# Patient Record
Sex: FEMALE | Race: ASIAN | Employment: OTHER | URBAN - METROPOLITAN AREA
[De-identification: names, ages, dates, MRNs, and addresses within clinical notes are randomized per-mention and may not be internally consistent; named-entity substitution may affect disease eponyms.]

---

## 2017-01-05 ENCOUNTER — PATIENT MESSAGE (OUTPATIENT)
Dept: INTERNAL MEDICINE CLINIC | Facility: CLINIC | Age: 82
End: 2017-01-05

## 2017-01-05 NOTE — TELEPHONE ENCOUNTER
From: Mustapha Trujillo  To: Shivam Raza MD  Sent: 1/5/2017 5:52 AM CST  Subject: Non-Urgent Medical Question    Jermaine Trujillo,    Thank you for your help with the FMLA forms.  In regards to the questions you messaged me:    1) I would be seeking intermittent FMLA for

## 2017-04-06 ENCOUNTER — PATIENT MESSAGE (OUTPATIENT)
Dept: INTERNAL MEDICINE CLINIC | Facility: CLINIC | Age: 82
End: 2017-04-06

## 2017-04-06 NOTE — TELEPHONE ENCOUNTER
From: Rose Gasca  To: Paulette Correa MD  Sent: 4/6/2017 4:13 PM CDT  Subject: Non-Urgent Medical Question    Thank you, Palma Duvall. I will fax to your attention.     Thanks,  Armaan Rich    ----- Message -----   From: Julian Cruz   Sent: 4/6/17, 3:43 PM  To: Klaudia Medrano Nita     ----- Message -----   From: Jose C Dsouza   Sent: 1/5/17, 8:52 AM   To: Gerri Noriega   Subject: RE: Non-Urgent Medical Question     Jermaine Moya,     I just wanted to let you know the forms are complete. Would you like to pick them up?  Or would you like me

## 2017-04-06 NOTE — TELEPHONE ENCOUNTER
From: Sahra Lynne  To: Rinku Erwin MD  Sent: 4/6/2017 6:38 AM CDT  Subject: Non-Urgent Medical Question    Jermaine Trujillo,    Would Dr. Nikolay Pruitt be able to renew the intermittent FMLA for my mother if I faxed over the form? She has an appointment later this month.

## 2017-04-10 ENCOUNTER — MED REC SCAN ONLY (OUTPATIENT)
Dept: INTERNAL MEDICINE CLINIC | Facility: CLINIC | Age: 82
End: 2017-04-10

## 2017-04-10 ENCOUNTER — PATIENT MESSAGE (OUTPATIENT)
Dept: INTERNAL MEDICINE CLINIC | Facility: CLINIC | Age: 82
End: 2017-04-10

## 2017-04-10 NOTE — TELEPHONE ENCOUNTER
From: [employer]  To: Anuradha Moar MD  Sent: 4/10/2017 5:48 AM CDT  Subject: Non-Urgent Medical Question    Jermaine Trujillo,    Thank you so much! Would you mind faxing the form to my employer ([employer]) at the number on the form?     I really appreciate al From: Arabella Gonzales   Sent: 4/6/17, 8:47 AM   To: Sahra Lynne   Subject: RE: Non-Urgent [address],     Would you want your intermittent FMLA to be written for the same frequency and a 3 month time?      Thank you,     Melecio Turcios RN     ----- Simone Zepeda again,   Jennifer Mediate

## 2017-04-28 ENCOUNTER — OFFICE VISIT (OUTPATIENT)
Dept: INTERNAL MEDICINE CLINIC | Facility: CLINIC | Age: 82
End: 2017-04-28

## 2017-04-28 VITALS
OXYGEN SATURATION: 94 % | DIASTOLIC BLOOD PRESSURE: 70 MMHG | HEART RATE: 95 BPM | HEIGHT: 59 IN | TEMPERATURE: 98 F | SYSTOLIC BLOOD PRESSURE: 130 MMHG | RESPIRATION RATE: 16 BRPM

## 2017-04-28 DIAGNOSIS — I10 ESSENTIAL HYPERTENSION: Primary | ICD-10-CM

## 2017-04-28 PROCEDURE — 99213 OFFICE O/P EST LOW 20 MIN: CPT | Performed by: INTERNAL MEDICINE

## 2017-04-28 RX ORDER — LISINOPRIL 20 MG/1
20 TABLET ORAL
Qty: 90 TABLET | Refills: 1 | Status: SHIPPED | OUTPATIENT
Start: 2017-04-28 | End: 2017-12-23

## 2017-04-28 NOTE — PATIENT INSTRUCTIONS
Eating Heart-Healthy Foods  Eating has a big impact on your heart health. In fact, eating healthier can improve several of your heart risks at once. For instance, it helps you manage weight, cholesterol, and blood pressure.  Here are ideas to help you jose Aim to make these foods staples of your diet. If you have diabetes, you may have different recommendations than what is listed here:  · Fruits and vegetable provide plenty of nutrients without a lot of calories.  At meals, fill half your plate with these fo · Choose ingredients that spice up your food without adding calories, fat, or sodium. Try these items: horseradish, hot sauce, lemon, mustard, nonfat salad dressings, and vinegar.  For salt-free herbs and spices, try basil, cilantro, cinnamon, pepper, and r

## 2017-04-28 NOTE — PROGRESS NOTES
HPI:    Patient ID: Ashley Martinez is a 80year old female. HPI  Ashley Martinez is a 80year old female. HPI:   Patient presents for recheck of her hypertension.  Pt has been taking medications as instructed, no medication side effects, home BP monitoring in t Location: [address] [place] [address]      Social History:      Smoking Status: Never Smoker                      Smokeless Status: Never Used                        Alcohol Use: No                  REVIEW OF SYSTEMS:   GENERAL HEALTH: feels well otherwise  SKIN: denies Visit:  Signed Prescriptions Disp Refills    lisinopril 20 MG Oral Tab 90 tablet 1      Sig: Take 1 tablet (20 mg total) by mouth once daily.            Imaging & Referrals:  None       #[id number]

## 2017-08-30 ENCOUNTER — PATIENT OUTREACH (OUTPATIENT)
Dept: INTERNAL MEDICINE CLINIC | Facility: CLINIC | Age: 82
End: 2017-08-30

## 2017-08-30 NOTE — PROGRESS NOTES
Spoke with PEREZ, son, and stated patient is due for her AWV. Son stated he is at work, and will call back to schedule an AWV.

## 2017-09-12 ENCOUNTER — OFFICE VISIT (OUTPATIENT)
Dept: INTERNAL MEDICINE CLINIC | Facility: CLINIC | Age: 82
End: 2017-09-12

## 2017-09-12 ENCOUNTER — HOSPITAL ENCOUNTER (OUTPATIENT)
Dept: GENERAL RADIOLOGY | Facility: HOSPITAL | Age: 82
Discharge: HOME | End: 2017-09-12
Attending: INTERNAL MEDICINE
Payer: MEDICARE

## 2017-09-12 VITALS
HEIGHT: 59 IN | TEMPERATURE: 98 F | OXYGEN SATURATION: 96 % | DIASTOLIC BLOOD PRESSURE: 62 MMHG | RESPIRATION RATE: 18 BRPM | HEART RATE: 99 BPM | SYSTOLIC BLOOD PRESSURE: 102 MMHG

## 2017-09-12 DIAGNOSIS — M25.552 LEFT HIP PAIN: Primary | ICD-10-CM

## 2017-09-12 DIAGNOSIS — M25.552 LEFT HIP PAIN: ICD-10-CM

## 2017-09-12 DIAGNOSIS — Z28.21 INFLUENZA VACCINATION DECLINED BY PATIENT: ICD-10-CM

## 2017-09-12 PROCEDURE — 99213 OFFICE O/P EST LOW 20 MIN: CPT | Performed by: INTERNAL MEDICINE

## 2017-09-12 PROCEDURE — 73502 X-RAY EXAM HIP UNI 2-3 VIEWS: CPT | Performed by: INTERNAL MEDICINE

## 2017-09-12 RX ORDER — MELATONIN
1000 DAILY
COMMUNITY

## 2017-09-12 RX ORDER — ACETAMINOPHEN AND CODEINE PHOSPHATE 300; 30 MG/1; MG/1
1 TABLET ORAL EVERY 6 HOURS PRN
Qty: 40 TABLET | Refills: 0 | Status: ON HOLD | OUTPATIENT
Start: 2017-09-12 | End: 2017-09-19

## 2017-09-12 NOTE — PATIENT INSTRUCTIONS
Hip Dislocation  The hip is a ball and socket joint. The round head of the thigh bone fits into the socket of the pelvis. A hip dislocation happens when the femur pops out of its socket.  This takes a strong force which tears the ligaments and joint capsu · Shortness of breath or chest pain  When to seek medical advice  Call your healthcare provider right away if any of these occur:  · Increasing hip pain  · Injured leg becomes pale or cold  · Numbness or weakness in the affected leg  · Increasing swelling,

## 2017-09-12 NOTE — PROGRESS NOTES
HPI:    Patient ID: Kareem Gibson is a 80year old female. Hip Pain    Pain location: left hip. This is a new problem. The current episode started yesterday. There has been a history of trauma (fall). The problem occurs constantly.  The problem has been grad appears well-developed and well-nourished. No distress. Cardiovascular: Normal rate, regular rhythm and normal heart sounds. No murmur heard. Pulmonary/Chest: Effort normal and breath sounds normal. No respiratory distress. She has no wheezes.  She ha

## 2017-09-13 ENCOUNTER — TELEPHONE (OUTPATIENT)
Dept: INTERNAL MEDICINE CLINIC | Facility: CLINIC | Age: 82
End: 2017-09-13

## 2017-09-13 DIAGNOSIS — S72.002A CLOSED FRACTURE OF LEFT HIP, INITIAL ENCOUNTER (HCC): ICD-10-CM

## 2017-09-13 DIAGNOSIS — S73.005A DISLOCATION OF LEFT HIP, INITIAL ENCOUNTER (HCC): Primary | ICD-10-CM

## 2017-09-13 NOTE — TELEPHONE ENCOUNTER
Meg Crowder MD  Orthopedic Surgery, Sports Medicine  [address]  [address], [address]  Phone: [phone]  Fax: [phone] with Son and relayed results. He verbalized understanding and agreed with plan.  He will have patient's da

## 2017-09-13 NOTE — TELEPHONE ENCOUNTER
----- Message from Kelly Matthews MD sent at 9/13/2017  8:06 AM CDT -----  Mildly displaced, acute appearing fracture of the medial left superior pubic ramus  Due to age, I feel fracture should be treated non operatively.  Refer to dr Liam Patino for opinion

## 2017-09-13 NOTE — TELEPHONE ENCOUNTER
Son called back and I relayed results again and gave Dr. Pita Lizarraga information.  He verbalized understanding and stated they will call Dr. Julia Baker today

## 2017-09-16 ENCOUNTER — HOSPITAL ENCOUNTER (INPATIENT)
Facility: HOSPITAL | Age: 82
LOS: 3 days | Discharge: NURSING, REHAB OR LONG-TERM CARE | DRG: 091 | End: 2017-09-19
Attending: EMERGENCY MEDICINE | Admitting: HOSPITALIST
Payer: MEDICARE

## 2017-09-16 ENCOUNTER — APPOINTMENT (OUTPATIENT)
Dept: GENERAL RADIOLOGY | Facility: HOSPITAL | Age: 82
DRG: 091 | End: 2017-09-16
Attending: EMERGENCY MEDICINE
Payer: MEDICARE

## 2017-09-16 ENCOUNTER — APPOINTMENT (OUTPATIENT)
Dept: CT IMAGING | Facility: HOSPITAL | Age: 82
DRG: 091 | End: 2017-09-16
Attending: EMERGENCY MEDICINE
Payer: MEDICARE

## 2017-09-16 DIAGNOSIS — N39.0 URINARY TRACT INFECTION WITHOUT HEMATURIA, SITE UNSPECIFIED: ICD-10-CM

## 2017-09-16 DIAGNOSIS — R26.9 GAIT DISTURBANCE: ICD-10-CM

## 2017-09-16 DIAGNOSIS — M25.552 LEFT HIP PAIN: ICD-10-CM

## 2017-09-16 DIAGNOSIS — E86.0 DEHYDRATION: ICD-10-CM

## 2017-09-16 DIAGNOSIS — R40.0 SOMNOLENCE: Primary | ICD-10-CM

## 2017-09-16 DIAGNOSIS — S32.592G: ICD-10-CM

## 2017-09-16 DIAGNOSIS — D64.9 ANEMIA, UNSPECIFIED TYPE: ICD-10-CM

## 2017-09-16 LAB
ALBUMIN SERPL-MCNC: 3.2 G/DL (ref 3.5–4.8)
ALP LIVER SERPL-CCNC: 60 U/L (ref 55–142)
ALT SERPL-CCNC: 16 U/L (ref 14–54)
AST SERPL-CCNC: 35 U/L (ref 15–41)
BASOPHILS # BLD AUTO: 0.02 X10(3) UL (ref 0–0.1)
BASOPHILS NFR BLD AUTO: 0.2 %
BILIRUB SERPL-MCNC: 0.6 MG/DL (ref 0.1–2)
BILIRUB UR QL STRIP.AUTO: NEGATIVE
BUN BLD-MCNC: 28 MG/DL (ref 8–20)
CALCIUM BLD-MCNC: 8.5 MG/DL (ref 8.3–10.3)
CHLORIDE: 103 MMOL/L (ref 101–111)
CO2: 25 MMOL/L (ref 22–32)
COLOR UR AUTO: YELLOW
CREAT BLD-MCNC: 0.88 MG/DL (ref 0.55–1.02)
EOSINOPHIL # BLD AUTO: 0.05 X10(3) UL (ref 0–0.3)
EOSINOPHIL NFR BLD AUTO: 0.6 %
ERYTHROCYTE [DISTWIDTH] IN BLOOD BY AUTOMATED COUNT: 12.9 % (ref 11.5–16)
GLUCOSE BLD-MCNC: 134 MG/DL (ref 70–99)
GLUCOSE UR STRIP.AUTO-MCNC: NEGATIVE MG/DL
HCT VFR BLD AUTO: 33.4 % (ref 34–50)
HGB BLD-MCNC: 11 G/DL (ref 12–16)
IMMATURE GRANULOCYTE COUNT: 0.03 X10(3) UL (ref 0–1)
IMMATURE GRANULOCYTE RATIO %: 0.4 %
KETONES UR STRIP.AUTO-MCNC: NEGATIVE MG/DL
LYMPHOCYTES # BLD AUTO: 1.13 X10(3) UL (ref 0.9–4)
LYMPHOCYTES NFR BLD AUTO: 13.8 %
M PROTEIN MFR SERPL ELPH: 8 G/DL (ref 6.1–8.3)
MCH RBC QN AUTO: 31.3 PG (ref 27–33.2)
MCHC RBC AUTO-ENTMCNC: 32.9 G/DL (ref 31–37)
MCV RBC AUTO: 94.9 FL (ref 81–100)
MONOCYTES # BLD AUTO: 0.53 X10(3) UL (ref 0.1–0.6)
MONOCYTES NFR BLD AUTO: 6.5 %
NEUTROPHIL ABS PRELIM: 6.43 X10 (3) UL (ref 1.3–6.7)
NEUTROPHILS # BLD AUTO: 6.43 X10(3) UL (ref 1.3–6.7)
NEUTROPHILS NFR BLD AUTO: 78.5 %
NITRITE UR QL STRIP.AUTO: POSITIVE
PH UR STRIP.AUTO: 5 [PH] (ref 4.5–8)
PLATELET # BLD AUTO: 239 10(3)UL (ref 150–450)
POTASSIUM SERPL-SCNC: 5.3 MMOL/L (ref 3.6–5.1)
PROT UR STRIP.AUTO-MCNC: NEGATIVE MG/DL
RBC # BLD AUTO: 3.52 X10(6)UL (ref 3.8–5.1)
RED CELL DISTRIBUTION WIDTH-SD: 44.7 FL (ref 35.1–46.3)
SODIUM SERPL-SCNC: 134 MMOL/L (ref 136–144)
SP GR UR STRIP.AUTO: 1.02 (ref 1–1.03)
UROBILINOGEN UR STRIP.AUTO-MCNC: <2 MG/DL
WBC # BLD AUTO: 8.2 X10(3) UL (ref 4–13)
WBC CLUMPS UR QL AUTO: PRESENT

## 2017-09-16 PROCEDURE — 74000 XR ABDOMEN (KUB) (1 AP VIEW)  (CPT=74000): CPT | Performed by: EMERGENCY MEDICINE

## 2017-09-16 PROCEDURE — 76376 3D RENDER W/INTRP POSTPROCES: CPT | Performed by: EMERGENCY MEDICINE

## 2017-09-16 PROCEDURE — 73700 CT LOWER EXTREMITY W/O DYE: CPT | Performed by: EMERGENCY MEDICINE

## 2017-09-16 PROCEDURE — 99223 1ST HOSP IP/OBS HIGH 75: CPT | Performed by: HOSPITALIST

## 2017-09-16 RX ORDER — BISACODYL 10 MG
10 SUPPOSITORY, RECTAL RECTAL ONCE
Status: COMPLETED | OUTPATIENT
Start: 2017-09-16 | End: 2017-09-16

## 2017-09-16 RX ORDER — POLYETHYLENE GLYCOL 3350 17 G/17G
17 POWDER, FOR SOLUTION ORAL DAILY
Status: DISCONTINUED | OUTPATIENT
Start: 2017-09-17 | End: 2017-09-17

## 2017-09-16 RX ORDER — HEPARIN SODIUM 5000 [USP'U]/ML
5000 INJECTION, SOLUTION INTRAVENOUS; SUBCUTANEOUS EVERY 12 HOURS SCHEDULED
Status: DISCONTINUED | OUTPATIENT
Start: 2017-09-16 | End: 2017-09-19

## 2017-09-16 RX ORDER — SODIUM CHLORIDE 9 MG/ML
INJECTION, SOLUTION INTRAVENOUS CONTINUOUS
Status: DISCONTINUED | OUTPATIENT
Start: 2017-09-16 | End: 2017-09-17

## 2017-09-16 RX ORDER — ONDANSETRON 2 MG/ML
8 INJECTION INTRAMUSCULAR; INTRAVENOUS EVERY 6 HOURS PRN
Status: DISCONTINUED | OUTPATIENT
Start: 2017-09-16 | End: 2017-09-19

## 2017-09-16 RX ORDER — ACETAMINOPHEN AND CODEINE PHOSPHATE 300; 30 MG/1; MG/1
1 TABLET ORAL EVERY 4 HOURS PRN
Status: DISCONTINUED | OUTPATIENT
Start: 2017-09-16 | End: 2017-09-19

## 2017-09-16 RX ORDER — TRAZODONE HYDROCHLORIDE 50 MG/1
50 TABLET ORAL NIGHTLY PRN
Status: DISCONTINUED | OUTPATIENT
Start: 2017-09-16 | End: 2017-09-19

## 2017-09-16 RX ORDER — LISINOPRIL 20 MG/1
20 TABLET ORAL
Status: DISCONTINUED | OUTPATIENT
Start: 2017-09-17 | End: 2017-09-19

## 2017-09-16 RX ORDER — HYDRALAZINE HYDROCHLORIDE 20 MG/ML
5 INJECTION INTRAMUSCULAR; INTRAVENOUS EVERY 6 HOURS PRN
Status: DISCONTINUED | OUTPATIENT
Start: 2017-09-16 | End: 2017-09-17

## 2017-09-16 RX ORDER — ACETAMINOPHEN 325 MG/1
650 TABLET ORAL EVERY 6 HOURS PRN
Status: DISCONTINUED | OUTPATIENT
Start: 2017-09-16 | End: 2017-09-19

## 2017-09-16 RX ORDER — SODIUM CHLORIDE 9 MG/ML
INJECTION, SOLUTION INTRAVENOUS ONCE
Status: COMPLETED | OUTPATIENT
Start: 2017-09-16 | End: 2017-09-16

## 2017-09-16 NOTE — ED NOTES
PT to ED with son with diagnosed pelvic fracture from prior fall. Son reports increased pain and pt feeling more tired.

## 2017-09-16 NOTE — H&P
[facility]  History and Physical     Anette Robles Patient Status:  Emergency    3/25/1927 MRN [id number]   Location [address] [facility] Attending Darren Wynn MD   Hosp Day # 0 PCP Juan Alberto Campso MD     Chief Complaint: con tablet by mouth every 6 (six) hours as needed for Pain. Disp: 40 tablet Rfl: 0   lisinopril 20 MG Oral Tab Take 1 tablet (20 mg total) by mouth once daily. Disp: 90 tablet Rfl: 1   CALCIUM OR Take 1,000 mg by mouth daily.  Disp:  Rfl:    Cholecalciferol (VI input(s): TROP, CK in the last 72 hours. Imaging: Imaging data reviewed in Epic. ASSESSMENT / PLAN:     1. Recent fall with pelvic fractures and inability to ambulate  2.  Encephalopathy due to narcotics and UTI-check abg to ensure no hypercapnea  3

## 2017-09-16 NOTE — ED PROVIDER NOTES
Patient Seen in: [facility] Emergency Department    History   Patient presents with:  Pain (neurologic)    Stated Complaint: pelvic fracture - pain, not eating    HPI    28-year-old female presents to the emergency department in the care of her fami reviewed from today and agreed except as otherwise stated in HPI.     Physical Exam   ED Triage Vitals  BP: 115/69 [09/16/17 1213]  Pulse: 97 [09/16/17 1213]  Resp: 14 [09/16/17 1213]  Temp: 99.5 °F (37.5 °C) [09/16/17 1213]  Temp src: Temporal [09/16/17 12 (*)     RBC URINE 3-5 (*)     Bacteria Urine 3+ (*)     Mucous Urine 1+ (*)     WBC Clump Present (*)     All other components within normal limits   CBC W/ DIFFERENTIAL - Abnormal; Notable for the following:     RBC 3.52 (*)     HGB 11.0 (*)     HCT 33.4 there was not a more serious injury that perhaps is not well seen on the plain films. There was evidence of a superior rami fracture as well but no acute hip fracture.   With the multiple issues going on and patient declining it is felt that she is best se

## 2017-09-16 NOTE — ED INITIAL ASSESSMENT (HPI)
Pt's son states pt had a fall on Monday, had x-rays and diagnosed with pelvic fracture. Son reports generalized weakness since fall.

## 2017-09-17 PROBLEM — E46 MALNUTRITION (HCC): Status: ACTIVE | Noted: 2017-09-17

## 2017-09-17 LAB
BUN BLD-MCNC: 18 MG/DL (ref 8–20)
CALCIUM BLD-MCNC: 8.1 MG/DL (ref 8.3–10.3)
CHLORIDE: 109 MMOL/L (ref 101–111)
CO2: 18 MMOL/L (ref 22–32)
CREAT BLD-MCNC: 0.53 MG/DL (ref 0.55–1.02)
GLUCOSE BLD-MCNC: 77 MG/DL (ref 70–99)
POTASSIUM SERPL-SCNC: 4 MMOL/L (ref 3.6–5.1)
SODIUM SERPL-SCNC: 139 MMOL/L (ref 136–144)

## 2017-09-17 PROCEDURE — 99232 SBSQ HOSP IP/OBS MODERATE 35: CPT | Performed by: HOSPITALIST

## 2017-09-17 RX ORDER — LACTULOSE 10 G/15ML
20 SOLUTION ORAL ONCE
Status: COMPLETED | OUTPATIENT
Start: 2017-09-17 | End: 2017-09-17

## 2017-09-17 RX ORDER — LABETALOL HYDROCHLORIDE 5 MG/ML
10 INJECTION, SOLUTION INTRAVENOUS EVERY 4 HOURS PRN
Status: DISCONTINUED | OUTPATIENT
Start: 2017-09-17 | End: 2017-09-19

## 2017-09-17 RX ORDER — SENNA AND DOCUSATE SODIUM 50; 8.6 MG/1; MG/1
2 TABLET, FILM COATED ORAL 2 TIMES DAILY
Status: DISCONTINUED | OUTPATIENT
Start: 2017-09-17 | End: 2017-09-18

## 2017-09-17 RX ORDER — TRAMADOL HYDROCHLORIDE 50 MG/1
50 TABLET ORAL EVERY 6 HOURS PRN
Status: DISCONTINUED | OUTPATIENT
Start: 2017-09-17 | End: 2017-09-19

## 2017-09-17 NOTE — ED NOTES
Bedside report from [facility] received. Pt remains resting comfortably with adult family at bs. Phlebotomy has been paged for blood c/s x 2. IVPB at bs for infusion after cultures drawn by phlebotomy. Family updated on inpt poc and verbalized understanding.

## 2017-09-17 NOTE — DIETARY MALNUTRITION NOTE
NUTRITION INITIAL ASSESSMENT     Pt meets moderate malnutrition criteria.     NUTRITION DIAGNOSIS/PROBLEM:    Malnutrition related to extended age, AMS, lethargy as evidenced by decreased appetite/intake for >5 days and notable muscle wasting at temple and No  Food Allergies: No  Cultural/Ethnic/Pentecostalism Preferences Addresses: Yes    NUTRITION RELATED PHYSICAL FINDINGS:     1. Body Fat/Muscle Mass: mild muscle wasting noted at temple and clavicle      2.  Fluid Accumulation: none noted     NUTRITION PRESCRIP

## 2017-09-17 NOTE — PLAN OF CARE
PAIN - ADULT    • Verbalizes/displays adequate comfort level or patient's stated pain goal Progressing          PAIN - ADULT    • Verbalizes/displays adequate comfort level or patient's stated pain goal Progressing

## 2017-09-17 NOTE — OCCUPATIONAL THERAPY NOTE
OCCUPATIONAL THERAPY EVALUATION - INPATIENT     Room Number: 361/361-A  Evaluation Date: 9/17/2017  Type of Evaluation: Initial  Presenting Problem: fall 9/12 & L pelvic fx, generalized weakness since home from ER    Physician Order: IP Consult to [facility] bench  Other Equipment: None    Occupation/Status: not working  Hand Dominance: Right  Drives: No  Patient Regularly Uses: None    Prior Level of Function: Pt is never home alone. Pt lives with son and when son is at work others are there to care for pt. on observation of tasks)   Fine Motor    WFL (based on observation of tasks)     ADDITIONAL TESTS       Modified Barthel Index score of 3/20; <15 usually indicates moderate disability; <10 usually indicates severe disability in ADL dysfunction in the areas Patient End of Session: Up in chair;Needs met;Call light within reach;RN aware of session/findings; All patient questions and concerns addressed;SCDs in place; Family present    ASSESSMENT     Patient is a 80year old female admitted on 9/16/2017 for gener Complexity  Occupational Profile/Medical History MODERATE - Expanded review of history including review of medical or therapy record   Specific performance deficits impacting engagement in ADL/IADL MODERATE  3 - 5 performance deficits   Client Assessment/P

## 2017-09-17 NOTE — PROGRESS NOTES
[facility]  Progress Note     Toan Maldonado Patient Status:  Inpatient    3/25/1927 MRN [id number]   [facility] 3SW-A Attending Yordan Hill MD   Hosp Day # 1 PCP Colin Chapman MD     Chief Complaint: hip pain    S: Patient feel rocephin  4. Hyperkalemia  5. Hyponatremia  6. Dehydration  7. Anemia  8. Malnutrition  1. Dietician eval  2. Start ensure  9. Metabolic acidosis    Plan of care: as above. PT and pain control. Laxatives for constipation.      Quality:  · DVT Prophylaxis: h

## 2017-09-17 NOTE — PHYSICAL THERAPY NOTE
PHYSICAL THERAPY EVALUATION - INPATIENT     Room Number: 361/361-A  Evaluation Date: 9/17/2017  Type of Evaluation: Initial  Physician Order: PT Eval and Treat    Presenting Problem: L Inferior Pubic Ramis Fx  Reason for Therapy: Mobility Dysfunction a with a 4WW prior to her fall. Used a wheelchair for out of home mobility. Pt's daughters assisted with dressing and bathing. Significant decline in participation with mobility, self care, and appetite since fall 9/11/17.      SUBJECTIVE  Pt's son reports sh blankets)?: Unable   -   Sitting down on and standing up from a chair with arms (e.g., wheelchair, bedside commode, etc.): Unable   -   Moving from lying on back to sitting on the side of the bed?: Unable   How much help from another person does the patien potential POC with pt's son and all questions answered prior to leaving room. Exercise/Education Provided:  Bed mobility  Transfer training    Patient End of Session: Up in chair; With [address] staff;Needs met;Call light within reach; All patient questions and to safely return home with family assist.    73% and Above LTAC   72-51% MARY/IRF   50-37% Home with [facility]   36 and below Home with no services       PLAN  PT Treatment Plan: Bed mobility; Endurance; Patient education; Family education;Gait training;Strengthening

## 2017-09-17 NOTE — PROGRESS NOTES
NURSING ADMISSION NOTE      Patient admitted via cart from ER  Oriented to room. Safety precautions initiated. Bed in low position. Call light in reach.   Son at bedside

## 2017-09-17 NOTE — PROGRESS NOTES
Spoke to Dr Puma Madrid regarding patient requesting DNR and elevated BP. Explained to both sons and patient about what at DNR is all agree with order for DNR. Order received from Dr Puma Madrid.

## 2017-09-17 NOTE — PLAN OF CARE
GASTROINTESTINAL - ADULT    • Maintains or returns to baseline bowel function Progressing        PAIN - ADULT    • Verbalizes/displays adequate comfort level or patient's stated pain goal Progressing         Has been up in chair today.   Has had a bowel mov

## 2017-09-18 ENCOUNTER — MED REC SCAN ONLY (OUTPATIENT)
Dept: INTERNAL MEDICINE CLINIC | Facility: CLINIC | Age: 82
End: 2017-09-18

## 2017-09-18 PROCEDURE — 99232 SBSQ HOSP IP/OBS MODERATE 35: CPT | Performed by: HOSPITALIST

## 2017-09-18 RX ORDER — SENNA AND DOCUSATE SODIUM 50; 8.6 MG/1; MG/1
1 TABLET, FILM COATED ORAL DAILY
Status: DISCONTINUED | OUTPATIENT
Start: 2017-09-19 | End: 2017-09-19

## 2017-09-18 NOTE — OCCUPATIONAL THERAPY NOTE
OCCUPATIONAL THERAPY TREATMENT NOTE - INPATIENT     Room Number: 871/550-T  Session: 1   Number of Visits to Meet Established Goals: 5    Presenting Problem: fall 9/12 & L pelvic fx, generalized weakness since home from ER    History related to current adm TOLERANCE  O2 Saturation: 100%  Room air  No shortness of breath  Heart Rate: to 140s and 150s intermittently, both with activity and at rest; RN notified; quickly returned to [address] [place] [address] ‘6-Clicks’ Inpatient Daily descent. Increased time required for all tasks. Patient also reinforced on safety, fall prevention, OT role, care plan and discharge recommendations. Patient End of Session: Up in chair;Needs met;Call light within reach;RN aware of session/findings; All p

## 2017-09-18 NOTE — CM/SW NOTE
09/18/17 1400   CM/SW Referral Data   Referral Source Nurse; Other  (PT/OT)   Reason for Referral Discharge planning   Informant Children;[facility] Staff   Pertinent Medical Hx   Primary Care Physician Name EZ Lowe MD   Patient Info   Patient's Mental Statu discuss with family, the possibility of taking pt home instead of going to [facility]. SW will remain available for any questions.   Will f/u in AM.

## 2017-09-18 NOTE — PLAN OF CARE
GASTROINTESTINAL - ADULT    • Maintains or returns to baseline bowel function Progressing        Impaired Activities of Daily Living    • Achieve highest/safest level of independence in self care Progressing        Impaired Functional Mobility    • Achieve

## 2017-09-18 NOTE — PROGRESS NOTES
[facility]  Progress Note     Autumn Cassidy Patient Status:  Inpatient    3/25/1927 MRN [id number]   [facility] 3SW-A Attending Laine Shabazz MD   Hosp Day # 2 PCP Daryn Mckenzie MD     Chief Complaint: hip pain    S: Patient stil narcotics  2. Improving  3. UTI  1. Change to ancef  4. Hyperkalemia  5. Hyponatremia  6. Dehydration  7. Anemia  8. Malnutrition  1. Diet supplements  9. Metabolic acidosis    Plan of care: as above. Possible d/c mazin if stable and BCX neg.     Quality:  ·

## 2017-09-18 NOTE — PROGRESS NOTES
Pt's heart rate was in the 140's and bp 161/99, PRN Labetalol was given, upon reassessment HR was in the 90's and BP was 156/80, DR Shaffer was notified.

## 2017-09-18 NOTE — PHYSICAL THERAPY NOTE
PHYSICAL THERAPY TREATMENT NOTE - INPATIENT    Room Number: 361/361-A     Session: 1   Number of Visits to Meet Established Goals: 5    Presenting Problem: L Inferior Pubic Ramis Fx  History related to current admission: Family brought pt to ED 9/16 due t Sitting: Poor  Dynamic Sitting: Poor -           Static Standing: Poor -  Dynamic Standing: Dependent    ACTIVITY TOLERANCE  Heart Rate: at rest 96 and with activity at times 155 but not consistent wtih mobiltiy. HR increased even in sitting.      AM-PAC '6 of session/findings;SCDs in place    ASSESSMENT   The pt demonstrates good progress in functional mobility and activity tolerance. Pt denied pain with mobility, demonstrated SOB with activity and intermittent elevated HR at rest and with activity.  Pt ambul

## 2017-09-19 VITALS
DIASTOLIC BLOOD PRESSURE: 63 MMHG | RESPIRATION RATE: 16 BRPM | TEMPERATURE: 98 F | WEIGHT: 70 LBS | SYSTOLIC BLOOD PRESSURE: 142 MMHG | HEART RATE: 87 BPM | BODY MASS INDEX: 14 KG/M2 | OXYGEN SATURATION: 96 %

## 2017-09-19 LAB
BUN BLD-MCNC: 20 MG/DL (ref 8–20)
CALCIUM BLD-MCNC: 7.7 MG/DL (ref 8.3–10.3)
CHLORIDE: 111 MMOL/L (ref 101–111)
CO2: 20 MMOL/L (ref 22–32)
CREAT BLD-MCNC: 0.63 MG/DL (ref 0.55–1.02)
GLUCOSE BLD-MCNC: 79 MG/DL (ref 70–99)
HAV IGM SER QL: 2.1 MG/DL (ref 1.7–3)
POTASSIUM SERPL-SCNC: 3.3 MMOL/L (ref 3.6–5.1)
SODIUM SERPL-SCNC: 140 MMOL/L (ref 136–144)

## 2017-09-19 PROCEDURE — 99239 HOSP IP/OBS DSCHRG MGMT >30: CPT | Performed by: HOSPITALIST

## 2017-09-19 RX ORDER — ACETAMINOPHEN AND CODEINE PHOSPHATE 300; 30 MG/1; MG/1
1 TABLET ORAL EVERY 6 HOURS PRN
Qty: 30 TABLET | Refills: 0 | Status: SHIPPED | OUTPATIENT
Start: 2017-09-19 | End: 2018-05-03 | Stop reason: ALTCHOICE

## 2017-09-19 RX ORDER — POTASSIUM CHLORIDE 20 MEQ/1
40 TABLET, EXTENDED RELEASE ORAL EVERY 4 HOURS
Status: COMPLETED | OUTPATIENT
Start: 2017-09-19 | End: 2017-09-19

## 2017-09-19 RX ORDER — TRAMADOL HYDROCHLORIDE 50 MG/1
50 TABLET ORAL EVERY 6 HOURS PRN
Qty: 30 TABLET | Refills: 0 | Status: SHIPPED | OUTPATIENT
Start: 2017-09-19 | End: 2018-05-03 | Stop reason: ALTCHOICE

## 2017-09-19 RX ORDER — CEPHALEXIN 250 MG/1
250 CAPSULE ORAL 3 TIMES DAILY
Qty: 12 CAPSULE | Refills: 0 | Status: SHIPPED | OUTPATIENT
Start: 2017-09-19 | End: 2017-09-23

## 2017-09-19 NOTE — PROGRESS NOTES
[facility] Pharmacy Note:  Renal Adjustment for Ancef (cefazolin)    Jean Paul Bright is a 80year old female who has been prescribed Ancef (cefazolin) 1000 mg every 8 hrs. CrCl is estimated creatinine clearance is 29.8 mL/min (based on SCr of 0.63 mg/dL).  so the

## 2017-09-19 NOTE — CM/SW NOTE
Spoke with grandson, Toribio Jeans. Family planning to [address] [place] [address] and [facility] and make MARY choice for today. Referrals sent via ECIN to above SARs.

## 2017-09-19 NOTE — CM/SW NOTE
Call from grandson, Marvel Daniels, that family has decided on [facility] of [facility]. Discussed ambulance transport for pt with a 4:30PM d/c time- he notes that this would be better for family. [facility]   Pt is accepted with 4:30

## 2017-09-19 NOTE — PHYSICAL THERAPY NOTE
PHYSICAL THERAPY TREATMENT NOTE - INPATIENT    Room Number: 361/361-A     Session: 2  Number of Visits to Meet Established Goals: 5    Presenting Problem: Left Inferior Pubic Ramis Fx  History related to current admission: Family brought pt to ED 9/16 due Techniques: Activity promotion; Body mechanics;Breathing techniques;Relaxation;Repositioning    BALANCE                                                                                                                     Static Sitting: Fair -  Dynamic Sitti pumps  Heel slides  Knee extension  Quad sets  SAQ     Position Sitting and Supine     Repetitions   12   Sets   1     Patient End of Session: Up in chair;Needs met;Call light within reach;RN aware of session/findings; All patient questions and concerns add

## 2017-09-19 NOTE — CM/SW NOTE
BPCI:  Met with patient at bedside to explain the BPCI/Medicare program. Patient agreed with phone f/u for 3 months from [address] [place] [address] after discharge from [facility]. Patient was enrolled under . BPCI/Medicare Letter and Brochure provided.      Kenyatta Bowen

## 2017-09-19 NOTE — OCCUPATIONAL THERAPY NOTE
OCCUPATIONAL THERAPY TREATMENT NOTE - INPATIENT     Room Number: 361/361-A  Session: 2   Number of Visits to Meet Established Goals: 5    Presenting Problem: fall 9/12 & L pelvic fx, generalized weakness since home from ER    History related to current adm TOLERANCE  O2 Saturation: 96%  Room air  No shortness of breath    ACTIVITIES OF DAILY LIVING ASSESSMENT  AM-PAC ‘6-Clicks’ Inpatient Daily Activity Short Form  How much help from another person does the patient currently need…  -   Putting on and taking o on safety, fall prevention, OT role, care plan and discharge recommendations. Patient End of Session: In bed;Needs met;Call light within reach;RN aware of session/findings; All patient questions and concerns addressed;SCDs in place; Alarm set; Family presen

## 2017-09-20 NOTE — DISCHARGE SUMMARY
[facility] [place] HOSPITAL[facility]  DISCHARGE SUMMARY     Jean Paul Bright Patient Status:  Inpatient    3/25/1927 MRN [id number]   [facility] 3SW-A Attending No att. providers found   Hosp Day # 3 PCP Brayan Wilson MD     Date of Admission: 2017  Date started on IV fluids. Her encephalopathy eventually resolved. The patient was significantly deconditioned. She was seen by physical therapy. She was eventually stable for discharge to subacute rehabilitation on a course of oral antibiotics.       Benny Murcia prescriptions at the location directed by your doctor or nurse    Bring a paper prescription for each of these medications  · Acetaminophen-Codeine 300-30 MG Tabs  · cephALEXin 250 MG Caps  · TraMADol HCl 50 MG Tabs         Follow-up appointment: Citlalli Carter

## 2017-09-20 NOTE — CM/SW NOTE
09/20/17 0800   Discharge disposition   Discharged to: Skilled Nurs   Name of Facillity/Home Care/Hospice ([facility])   Discharge transportation [facility] 9/19/17

## 2017-10-16 ENCOUNTER — TELEPHONE (OUTPATIENT)
Dept: INTERNAL MEDICINE CLINIC | Facility: CLINIC | Age: 82
End: 2017-10-16

## 2017-10-16 NOTE — TELEPHONE ENCOUNTER
Fax [phone]   If you have any orders for patient please fax to this number. This is the home health care.  Just a [place]

## 2017-10-25 ENCOUNTER — PATIENT OUTREACH (OUTPATIENT)
Dept: INTERNAL MEDICINE CLINIC | Facility: CLINIC | Age: 82
End: 2017-10-25

## 2017-10-25 NOTE — PROGRESS NOTES
LM with pt's son, Marjan Hernandes, that pt is due for her AWV - he said he will have his sister call us to schedule that appt.

## 2017-11-28 ENCOUNTER — OFFICE VISIT (OUTPATIENT)
Dept: INTERNAL MEDICINE CLINIC | Facility: CLINIC | Age: 82
End: 2017-11-28

## 2017-11-28 VITALS
SYSTOLIC BLOOD PRESSURE: 98 MMHG | RESPIRATION RATE: 16 BRPM | BODY MASS INDEX: 15.12 KG/M2 | DIASTOLIC BLOOD PRESSURE: 62 MMHG | HEART RATE: 88 BPM | HEIGHT: 59 IN | WEIGHT: 75 LBS | TEMPERATURE: 98 F

## 2017-11-28 DIAGNOSIS — Z09 HOSPITAL DISCHARGE FOLLOW-UP: Primary | ICD-10-CM

## 2017-11-28 DIAGNOSIS — Z28.21 PNEUMOCOCCAL VACCINATION DECLINED BY PATIENT: ICD-10-CM

## 2017-11-28 DIAGNOSIS — E86.0 DEHYDRATION: ICD-10-CM

## 2017-11-28 DIAGNOSIS — Z28.21 INFLUENZA VACCINATION DECLINED BY PATIENT: ICD-10-CM

## 2017-11-28 DIAGNOSIS — I10 ESSENTIAL HYPERTENSION: ICD-10-CM

## 2017-11-28 PROBLEM — N39.0 URINARY TRACT INFECTION WITHOUT HEMATURIA, SITE UNSPECIFIED: Status: RESOLVED | Noted: 2017-09-16 | Resolved: 2017-11-28

## 2017-11-28 PROBLEM — R40.0 SOMNOLENCE: Status: RESOLVED | Noted: 2017-09-16 | Resolved: 2017-11-28

## 2017-11-28 PROBLEM — D64.9 ANEMIA, UNSPECIFIED TYPE: Status: RESOLVED | Noted: 2017-09-16 | Resolved: 2017-11-28

## 2017-11-28 PROBLEM — S32.592G: Status: RESOLVED | Noted: 2017-09-16 | Resolved: 2017-11-28

## 2017-11-28 PROBLEM — R26.9 GAIT DISTURBANCE: Status: RESOLVED | Noted: 2017-09-16 | Resolved: 2017-11-28

## 2017-11-28 PROBLEM — E46 MALNUTRITION (HCC): Status: RESOLVED | Noted: 2017-09-17 | Resolved: 2017-11-28

## 2017-11-28 PROCEDURE — 99214 OFFICE O/P EST MOD 30 MIN: CPT | Performed by: INTERNAL MEDICINE

## 2017-11-28 NOTE — PATIENT INSTRUCTIONS
M?t N??c (? ng??i l?n) [Dehydration, Adult]  Tình tr?ng m?t n??c x?y ra khi c? th? b?n m?t quá alma?u n??c. Tình tr?ng này có th? là do nôn quá m?c ho?c tiêu ch?y, ?? alma?u m? hôi ho?c s?t torres. Nó c?ng có th? x?y ra n?u b?n không u?ng ?? n??c khi b? b?nh. · Tiêu ch?y th??ng princen (h?n 5 l?n m?t ngày); có máu (màu ?? ho?c ?en) ho?c d?ch nh?y khi tiêu ch?y  · Nôn ho?c ?i c?u ra máu  · S?ng b?ng ho?c ngày càng ?au b?ng  · Y?u ?t, chóng m?t ho?c ng?t x?u  · Bu?n ng? ho?c lú l?n b?t th??ng  · ?i ti?u ít n??c ho?

## 2017-11-28 NOTE — PROGRESS NOTES
HPI:    Patient ID: Herber Brothers is a 80year old female. HPI  History of Present Illness:     Pau Moe a 80year old female presents after hospital admission in September for confusion.  Fell and sustained pubic rami fractures.  pt became mor by mouth daily. Disp:  Rfl:      Allergies:No Known Allergies   PHYSICAL EXAM:   Physical Exam   Constitutional: She appears well-developed and well-nourished. No distress. Cardiovascular: Normal rate, regular rhythm and normal heart sounds.     No murmur

## 2017-12-05 ENCOUNTER — MED REC SCAN ONLY (OUTPATIENT)
Dept: INTERNAL MEDICINE CLINIC | Facility: CLINIC | Age: 82
End: 2017-12-05

## 2017-12-23 DIAGNOSIS — I10 ESSENTIAL HYPERTENSION: ICD-10-CM

## 2017-12-26 ENCOUNTER — TELEPHONE (OUTPATIENT)
Dept: INTERNAL MEDICINE CLINIC | Facility: CLINIC | Age: 82
End: 2017-12-26

## 2017-12-26 RX ORDER — LISINOPRIL 20 MG/1
TABLET ORAL
Qty: 90 TABLET | Refills: 1 | Status: SHIPPED | OUTPATIENT
Start: 2017-12-26 | End: 2019-01-01

## 2017-12-26 NOTE — TELEPHONE ENCOUNTER
Spoke with daughter paperwork that was sent is for her own illness and not care of a family member that is ill. She was instructed to contact her HR dept and send her the correct paperwork and then send to our office. She expressed understanding.

## 2017-12-27 ENCOUNTER — TELEPHONE (OUTPATIENT)
Dept: INTERNAL MEDICINE CLINIC | Facility: CLINIC | Age: 82
End: 2017-12-27

## 2017-12-27 NOTE — TELEPHONE ENCOUNTER
Fax received from patient's daughter, Trisha Wilde, requesting FMLA paperwork filled out. Please see packet in triage.

## 2018-01-01 ENCOUNTER — TELEPHONE (OUTPATIENT)
Dept: INTERNAL MEDICINE CLINIC | Facility: CLINIC | Age: 83
End: 2018-01-01

## 2018-01-01 ENCOUNTER — MED REC SCAN ONLY (OUTPATIENT)
Dept: INTERNAL MEDICINE CLINIC | Facility: CLINIC | Age: 83
End: 2018-01-01

## 2018-01-01 DIAGNOSIS — R53.1 RIGHT SIDED WEAKNESS: ICD-10-CM

## 2018-01-01 DIAGNOSIS — R47.01 APHASIA: ICD-10-CM

## 2018-01-01 DIAGNOSIS — I63.9 CEREBROVASCULAR ACCIDENT (CVA), UNSPECIFIED MECHANISM (HCC): ICD-10-CM

## 2018-01-01 RX ORDER — ATORVASTATIN CALCIUM 10 MG/1
10 TABLET, FILM COATED ORAL DAILY
Qty: 30 TABLET | Refills: 5 | Status: SHIPPED | OUTPATIENT
Start: 2018-01-01 | End: 2019-01-01

## 2018-04-04 ENCOUNTER — HOSPITAL (OUTPATIENT)
Dept: OTHER | Age: 83
End: 2018-04-04
Attending: INTERNAL MEDICINE

## 2018-04-04 LAB
ANALYZER ANC (IANC): ABNORMAL
ANION GAP SERPL CALC-SCNC: 14 MMOL/L (ref 10–20)
APTT PPP: 28 SECONDS (ref 22–30)
APTT PPP: NORMAL S
BASOPHILS # BLD: 0 THOUSAND/MCL (ref 0–0.3)
BASOPHILS NFR BLD: 0 %
BUN SERPL-MCNC: 19 MG/DL (ref 6–20)
BUN/CREAT SERPL: 25 (ref 7–25)
CALCIUM SERPL-MCNC: 9 MG/DL (ref 8.4–10.2)
CHLORIDE: 101 MMOL/L (ref 98–107)
CO2 SERPL-SCNC: 23 MMOL/L (ref 21–32)
CREAT SERPL-MCNC: 0.77 MG/DL (ref 0.51–0.95)
DIFFERENTIAL METHOD BLD: ABNORMAL
EOSINOPHIL # BLD: 0.1 THOUSAND/MCL (ref 0.1–0.5)
EOSINOPHIL NFR BLD: 1 %
ERYTHROCYTE [DISTWIDTH] IN BLOOD: 13.4 % (ref 11–15)
GLUCOSE BLDC GLUCOMTR-MCNC: 104 MG/DL (ref 65–99)
GLUCOSE SERPL-MCNC: 114 MG/DL (ref 65–99)
HEMATOCRIT: 35.2 % (ref 36–46.5)
HGB BLD-MCNC: 11.7 GM/DL (ref 12–15.5)
INR PPP: 1
LYMPHOCYTES # BLD: 3.1 THOUSAND/MCL (ref 1–4)
LYMPHOCYTES NFR BLD: 32 %
MAGNESIUM SERPL-MCNC: 2.1 MG/DL (ref 1.7–2.4)
MCH RBC QN AUTO: 29.8 PG (ref 26–34)
MCHC RBC AUTO-ENTMCNC: 33.2 GM/DL (ref 32–36.5)
MCV RBC AUTO: 89.6 FL (ref 78–100)
MONOCYTES # BLD: 0.6 THOUSAND/MCL (ref 0.3–0.9)
MONOCYTES NFR BLD: 6 %
NEUTROPHILS # BLD: 5.9 THOUSAND/MCL (ref 1.8–7.7)
NEUTROPHILS NFR BLD: 61 %
NEUTS SEG NFR BLD: ABNORMAL %
PERCENT NRBC: ABNORMAL
PLATELET # BLD: 289 THOUSAND/MCL (ref 140–450)
POTASSIUM SERPL-SCNC: 5.1 MMOL/L (ref 3.4–5.1)
PROTHROMBIN TIME: 10.2 SECONDS (ref 9.7–11.8)
PROTHROMBIN TIME: NORMAL
RBC # BLD: 3.93 MILLION/MCL (ref 4–5.2)
SODIUM SERPL-SCNC: 133 MMOL/L (ref 135–145)
TROPONIN I SERPL HS-MCNC: 0.04 NG/ML
WBC # BLD: 9.7 THOUSAND/MCL (ref 4.2–11)

## 2018-04-05 ENCOUNTER — CHARTING TRANS (OUTPATIENT)
Dept: OTHER | Age: 83
End: 2018-04-05

## 2018-04-05 ENCOUNTER — DIAGNOSTIC TRANS (OUTPATIENT)
Dept: OTHER | Age: 83
End: 2018-04-05

## 2018-04-05 LAB
ALBUMIN SERPL-MCNC: 3.2 GM/DL (ref 3.6–5.1)
ALP SERPL-CCNC: 53 UNIT/L (ref 45–117)
ALT SERPL-CCNC: 9 UNIT/L
ANALYZER ANC (IANC): ABNORMAL
ANION GAP SERPL CALC-SCNC: 14 MMOL/L (ref 10–20)
AST SERPL-CCNC: 18 UNIT/L
BASOPHILS # BLD: 0 THOUSAND/MCL (ref 0–0.3)
BASOPHILS NFR BLD: 0 %
BILIRUB CONJ SERPL-MCNC: 0.1 MG/DL (ref 0–0.2)
BILIRUB SERPL-MCNC: 0.5 MG/DL (ref 0.2–1)
BUN SERPL-MCNC: 15 MG/DL (ref 6–20)
BUN/CREAT SERPL: 21 (ref 7–25)
CALCIUM SERPL-MCNC: 8.3 MG/DL (ref 8.4–10.2)
CHLORIDE: 104 MMOL/L (ref 98–107)
CHOLEST SERPL-MCNC: 196 MG/DL
CHOLEST/HDLC SERPL: 3.2 {RATIO}
CO2 SERPL-SCNC: 22 MMOL/L (ref 21–32)
CREAT SERPL-MCNC: 0.72 MG/DL (ref 0.51–0.95)
DIFFERENTIAL METHOD BLD: ABNORMAL
EOSINOPHIL # BLD: 0 THOUSAND/MCL (ref 0.1–0.5)
EOSINOPHIL NFR BLD: 1 %
ERYTHROCYTE [DISTWIDTH] IN BLOOD: 13.4 % (ref 11–15)
GLUCOSE SERPL-MCNC: 98 MG/DL (ref 65–99)
HDLC SERPL-MCNC: 61 MG/DL
HEMATOCRIT: 30.4 % (ref 36–46.5)
HGB BLD-MCNC: 10 GM/DL (ref 12–15.5)
LDLC SERPL CALC-MCNC: 121 MG/DL
LYMPHOCYTES # BLD: 2.1 THOUSAND/MCL (ref 1–4)
LYMPHOCYTES NFR BLD: 29 %
MCH RBC QN AUTO: 29.3 PG (ref 26–34)
MCHC RBC AUTO-ENTMCNC: 32.9 GM/DL (ref 32–36.5)
MCV RBC AUTO: 89.1 FL (ref 78–100)
MONOCYTES # BLD: 0.5 THOUSAND/MCL (ref 0.3–0.9)
MONOCYTES NFR BLD: 7 %
NEUTROPHILS # BLD: 4.6 THOUSAND/MCL (ref 1.8–7.7)
NEUTROPHILS NFR BLD: 63 %
NEUTS SEG NFR BLD: ABNORMAL %
NONHDLC SERPL-MCNC: 135 MG/DL
PERCENT NRBC: ABNORMAL
PLATELET # BLD: 236 THOUSAND/MCL (ref 140–450)
POTASSIUM SERPL-SCNC: 4 MMOL/L (ref 3.4–5.1)
PROT SERPL-MCNC: 7.1 GM/DL (ref 6.4–8.2)
RBC # BLD: 3.41 MILLION/MCL (ref 4–5.2)
SODIUM SERPL-SCNC: 136 MMOL/L (ref 135–145)
TRIGLYCERIDE (TRIGP): 68 MG/DL
TROPONIN I SERPL HS-MCNC: 0.08 NG/ML
TROPONIN I SERPL HS-MCNC: 0.19 NG/ML
WBC # BLD: 7.2 THOUSAND/MCL (ref 4.2–11)

## 2018-04-06 LAB
ANALYZER ANC (IANC): ABNORMAL
ANION GAP SERPL CALC-SCNC: 14 MMOL/L (ref 10–20)
BASOPHILS # BLD: 0 THOUSAND/MCL (ref 0–0.3)
BASOPHILS NFR BLD: 0 %
BUN SERPL-MCNC: 14 MG/DL (ref 6–20)
BUN/CREAT SERPL: 19 (ref 7–25)
CALCIUM SERPL-MCNC: 8 MG/DL (ref 8.4–10.2)
CHLORIDE: 107 MMOL/L (ref 98–107)
CO2 SERPL-SCNC: 21 MMOL/L (ref 21–32)
CREAT SERPL-MCNC: 0.74 MG/DL (ref 0.51–0.95)
DIFFERENTIAL METHOD BLD: ABNORMAL
EOSINOPHIL # BLD: 0.1 THOUSAND/MCL (ref 0.1–0.5)
EOSINOPHIL NFR BLD: 2 %
ERYTHROCYTE [DISTWIDTH] IN BLOOD: 13.8 % (ref 11–15)
GLUCOSE SERPL-MCNC: 105 MG/DL (ref 65–99)
HEMATOCRIT: 28.1 % (ref 36–46.5)
HGB BLD-MCNC: 9.2 GM/DL (ref 12–15.5)
LYMPHOCYTES # BLD: 1.8 THOUSAND/MCL (ref 1–4)
LYMPHOCYTES NFR BLD: 33 %
MCH RBC QN AUTO: 29.6 PG (ref 26–34)
MCHC RBC AUTO-ENTMCNC: 32.7 GM/DL (ref 32–36.5)
MCV RBC AUTO: 90.4 FL (ref 78–100)
MONOCYTES # BLD: 0.5 THOUSAND/MCL (ref 0.3–0.9)
MONOCYTES NFR BLD: 9 %
NEUTROPHILS # BLD: 3.2 THOUSAND/MCL (ref 1.8–7.7)
NEUTROPHILS NFR BLD: 56 %
NEUTS SEG NFR BLD: ABNORMAL %
PERCENT NRBC: ABNORMAL
PLATELET # BLD: 224 THOUSAND/MCL (ref 140–450)
POTASSIUM SERPL-SCNC: 3.7 MMOL/L (ref 3.4–5.1)
RBC # BLD: 3.11 MILLION/MCL (ref 4–5.2)
SODIUM SERPL-SCNC: 138 MMOL/L (ref 135–145)
WBC # BLD: 5.6 THOUSAND/MCL (ref 4.2–11)

## 2018-04-07 LAB
ANALYZER ANC (IANC): ABNORMAL
ANION GAP SERPL CALC-SCNC: 13 MMOL/L (ref 10–20)
BASOPHILS # BLD: 0 THOUSAND/MCL (ref 0–0.3)
BASOPHILS NFR BLD: 0 %
BUN SERPL-MCNC: 12 MG/DL (ref 6–20)
BUN/CREAT SERPL: 20 (ref 7–25)
CALCIUM SERPL-MCNC: 8 MG/DL (ref 8.4–10.2)
CHLORIDE: 109 MMOL/L (ref 98–107)
CO2 SERPL-SCNC: 21 MMOL/L (ref 21–32)
CREAT SERPL-MCNC: 0.61 MG/DL (ref 0.51–0.95)
DIFFERENTIAL METHOD BLD: ABNORMAL
EOSINOPHIL # BLD: 0.2 THOUSAND/MCL (ref 0.1–0.5)
EOSINOPHIL NFR BLD: 3 %
ERYTHROCYTE [DISTWIDTH] IN BLOOD: 13.9 % (ref 11–15)
GLUCOSE SERPL-MCNC: 108 MG/DL (ref 65–99)
HEMATOCRIT: 31.5 % (ref 36–46.5)
HGB BLD-MCNC: 10.3 GM/DL (ref 12–15.5)
LYMPHOCYTES # BLD: 1.8 THOUSAND/MCL (ref 1–4)
LYMPHOCYTES NFR BLD: 30 %
MCH RBC QN AUTO: 29.6 PG (ref 26–34)
MCHC RBC AUTO-ENTMCNC: 32.7 GM/DL (ref 32–36.5)
MCV RBC AUTO: 90.5 FL (ref 78–100)
MONOCYTES # BLD: 0.4 THOUSAND/MCL (ref 0.3–0.9)
MONOCYTES NFR BLD: 7 %
NEUTROPHILS # BLD: 3.4 THOUSAND/MCL (ref 1.8–7.7)
NEUTROPHILS NFR BLD: 60 %
NEUTS SEG NFR BLD: ABNORMAL %
PERCENT NRBC: ABNORMAL
PLATELET # BLD: 215 THOUSAND/MCL (ref 140–450)
POTASSIUM SERPL-SCNC: 3.3 MMOL/L (ref 3.4–5.1)
RBC # BLD: 3.48 MILLION/MCL (ref 4–5.2)
SODIUM SERPL-SCNC: 140 MMOL/L (ref 135–145)
WBC # BLD: 5.8 THOUSAND/MCL (ref 4.2–11)

## 2018-04-09 ENCOUNTER — TELEPHONE (OUTPATIENT)
Dept: INTERNAL MEDICINE CLINIC | Facility: CLINIC | Age: 83
End: 2018-04-09

## 2018-04-09 DIAGNOSIS — I63.9 CEREBROVASCULAR ACCIDENT (CVA), UNSPECIFIED MECHANISM (HCC): Primary | ICD-10-CM

## 2018-04-09 DIAGNOSIS — R47.01 APHASIA: ICD-10-CM

## 2018-04-09 DIAGNOSIS — R53.1 RIGHT SIDED WEAKNESS: ICD-10-CM

## 2018-04-09 RX ORDER — ATORVASTATIN CALCIUM 10 MG/1
10 TABLET, FILM COATED ORAL DAILY
Qty: 30 TABLET | Refills: 5 | Status: SHIPPED | OUTPATIENT
Start: 2018-04-09 | End: 2018-01-01

## 2018-04-09 NOTE — TELEPHONE ENCOUNTER
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 Pt has deficits on right side of body, right arm is flaccid and right leg is weak. Pt also has aphasia but that has improved slightly.   Pt

## 2018-04-09 NOTE — TELEPHONE ENCOUNTER
Pt's grandson, Rosendo Thompson, called to say pt had a stroke on Wednesday 4/4 and was released from [facility] on 4/7 - she was in rehab but wanted to go home instead.  Now they need home health orders but the family was told to contact PCP offic

## 2018-04-12 ENCOUNTER — TELEPHONE (OUTPATIENT)
Dept: INTERNAL MEDICINE CLINIC | Facility: CLINIC | Age: 83
End: 2018-04-12

## 2018-04-12 NOTE — TELEPHONE ENCOUNTER
aMrci Peacock from [facility] called requesting to know if Dr. Valentine Liz will sign therapy order(s)?  Also, requesting orders for OT,

## 2018-04-18 ENCOUNTER — MED REC SCAN ONLY (OUTPATIENT)
Dept: INTERNAL MEDICINE CLINIC | Facility: CLINIC | Age: 83
End: 2018-04-18

## 2018-05-02 ENCOUNTER — TELEPHONE (OUTPATIENT)
Dept: INTERNAL MEDICINE CLINIC | Facility: CLINIC | Age: 83
End: 2018-05-02

## 2018-05-02 NOTE — TELEPHONE ENCOUNTER
Ok per Man Bower to extend OT. Left detailed message for Katie Mcrae at [facility] advising above.  Asked that she fax new orders for Dr. Alcazar to sign

## 2018-05-02 NOTE — TELEPHONE ENCOUNTER
Ok per Dr. Jose M Solo to extend speech therapy. [facility] informed and new orders will be faxed for Dr. Jose M Solo to sign.

## 2018-05-02 NOTE — TELEPHONE ENCOUNTER
Speech Therapy from [facility] called and stated patient is progressing slowly, but doing well, and needs an extension from 05/07/2018 for 2x/ 2weeks. Please call back.

## 2018-05-02 NOTE — TELEPHONE ENCOUNTER
Katie Mcrae, occupational therapist from [facility], called to get verbal orders to extend pt's OT for 2x/week for 2 wks. C/b at #[phone].

## 2018-05-03 ENCOUNTER — OFFICE VISIT (OUTPATIENT)
Dept: INTERNAL MEDICINE CLINIC | Facility: CLINIC | Age: 83
End: 2018-05-03

## 2018-05-03 ENCOUNTER — HOSPITAL ENCOUNTER (OUTPATIENT)
Dept: GENERAL RADIOLOGY | Age: 83
Discharge: HOME | End: 2018-05-03
Attending: INTERNAL MEDICINE
Payer: MEDICARE

## 2018-05-03 ENCOUNTER — MED REC SCAN ONLY (OUTPATIENT)
Dept: INTERNAL MEDICINE CLINIC | Facility: CLINIC | Age: 83
End: 2018-05-03

## 2018-05-03 VITALS
TEMPERATURE: 98 F | HEART RATE: 88 BPM | SYSTOLIC BLOOD PRESSURE: 104 MMHG | BODY MASS INDEX: 12.7 KG/M2 | DIASTOLIC BLOOD PRESSURE: 62 MMHG | RESPIRATION RATE: 16 BRPM | HEIGHT: 59 IN | WEIGHT: 63 LBS

## 2018-05-03 DIAGNOSIS — I10 ESSENTIAL HYPERTENSION: ICD-10-CM

## 2018-05-03 DIAGNOSIS — Z00.00 ENCOUNTER FOR ANNUAL HEALTH EXAMINATION: ICD-10-CM

## 2018-05-03 DIAGNOSIS — I65.23 BILATERAL CAROTID ARTERY STENOSIS: ICD-10-CM

## 2018-05-03 DIAGNOSIS — I69.351 FLACCID HEMIPLEGIA OF RIGHT DOMINANT SIDE AS LATE EFFECT OF CEREBRAL INFARCTION (HCC): ICD-10-CM

## 2018-05-03 DIAGNOSIS — H54.61 VISION LOSS OF RIGHT EYE: ICD-10-CM

## 2018-05-03 DIAGNOSIS — R05.9 COUGH: ICD-10-CM

## 2018-05-03 DIAGNOSIS — I63.132 CEREBROVASCULAR ACCIDENT (CVA) DUE TO EMBOLISM OF LEFT CAROTID ARTERY (HCC): ICD-10-CM

## 2018-05-03 DIAGNOSIS — Z91.81 RISK FOR FALLS: Primary | ICD-10-CM

## 2018-05-03 PROBLEM — I65.29 CAROTID ARTERY STENOSIS: Status: RESOLVED | Noted: 2018-04-04 | Resolved: 2018-05-03

## 2018-05-03 PROBLEM — I63.9 STROKE (HCC): Status: ACTIVE | Noted: 2018-04-04

## 2018-05-03 PROBLEM — I65.29 CAROTID ARTERY STENOSIS: Status: ACTIVE | Noted: 2018-04-04

## 2018-05-03 PROBLEM — G81.91 HEMIPLEGIA AFFECTING RIGHT DOMINANT SIDE (HCC): Status: ACTIVE | Noted: 2018-05-03

## 2018-05-03 PROCEDURE — 71046 X-RAY EXAM CHEST 2 VIEWS: CPT | Performed by: INTERNAL MEDICINE

## 2018-05-03 PROCEDURE — G0439 PPPS, SUBSEQ VISIT: HCPCS | Performed by: INTERNAL MEDICINE

## 2018-05-03 NOTE — PATIENT INSTRUCTIONS
Torres Geoffrey?t Áp—?ã???c Xác ? ?nh [High Blood Pressure -- Established]     Torres geoffrey? t áp (High Blood Pressure) [t?ng geoffrey ?t áp (Hypertension)] là m?t b?nh mãn tính. [place] ph?n l? n tr? ?ng h? p, nguyên nhân không ?? ?c bi?t.  Th? ?ng thì b?nh này có th? ki? m 4) Tránh các lo?i thu?c có ch?a ch? t kích ? ?ng lucy, kayla g?m thu?c viên ho?c thu?c x?t tr? c?m l?nh, xoang và thu?c thông m?i c? ng nh? là thu ?c gi?m cân. Xem l?i các c?nh báo v? t?ng geoffrey ?t áp trên nhãn thu?c. Các ch? t kích ?  ? ng nh? amphetamine ho ?c © 5316-4186 [address]. [facility] [place] [facility] [place]. All rights reserved. This information is not intended as a substitute for professional medical care. Always follow your healthcare professional's instructions.           Mandy Jimenez • Anyone with a family history    Colorectal Cancer Screening  Covered up to Age 76     Colonoscopy Screen   Covered every 10 years- more often if abnormal There are no preventive care reminders to display for this patient.  Update [employer] if rama Orders placed or performed in visit on 12/11/14  -INFLUENZA VIRUS VACCINE, >=1YEARS OF AGE    Please get every year    Pneumococcal 13 (Prevnar)  Covered Once after 65 No orders found for this or any previous visit.  Please get once after your Kena Laurent

## 2018-05-03 NOTE — PROGRESS NOTES
HPI:   Meche Briggs is a 80year old female who presents for a Medicare Subsequent Annual Wellness visit (Pt already had Initial Annual Wellness). HPI:  Here for physical  accompanied by daughter  Recent admit for stroke and right sided weakness.  Abiel help  Dressing: (P) Cannot do without help    She has Toileting difficulties based on screening of functional status. Toileting: (P) Cannot do without help  She has Eating difficulties based on screening of functional status.    Eating: (P) Cannot do with and Family/surrogate (if present), and forms available to patient in AVS         She has never smoked tobacco.  Ms. Isabelle Hernandez already takes aspirin and has it on her medication list.   CAGE Alcohol screening   Garland Thorpe was screened for Alcohol abuse and ha 400 Units by mouth daily. Lutein-Zeaxanthin 25-5 MG Oral Cap Take by mouth daily. 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 Declines PT.  Kyler Alcaraz Initial Preventative Physical Exam only, or if medically necessary Electrocardiogram date05/02/2016       Colorectal Cancer Screening      Colonoscopy Screen every 10 years There are no preventive care reminders to display for this patient.  Update [employer] men   Illicit injectable drug abusers     Tetanus Toxoid  Only covered with a cut with metal- TD and TDaP Not covered by Medicare Part B No vaccine history found This may be covered with your prescription benefits, but Medicare does not cover unless [employer]

## 2018-05-04 ENCOUNTER — TELEPHONE (OUTPATIENT)
Dept: INTERNAL MEDICINE CLINIC | Facility: CLINIC | Age: 83
End: 2018-05-04

## 2018-05-04 NOTE — TELEPHONE ENCOUNTER
Kathi Huerta, physical therapist with [facility], called to get verbal orders to add 2 more visits for the pt. c/b at #[phone].

## 2018-05-16 ENCOUNTER — HOSPITAL (OUTPATIENT)
Dept: OTHER | Age: 83
End: 2018-05-16
Attending: EMERGENCY MEDICINE

## 2018-05-16 ENCOUNTER — TELEPHONE (OUTPATIENT)
Dept: INTERNAL MEDICINE CLINIC | Facility: CLINIC | Age: 83
End: 2018-05-16

## 2018-05-16 LAB
AMORPH SED URNS QL MICRO: ABNORMAL
ANALYZER ANC (IANC): ABNORMAL
ANION GAP SERPL CALC-SCNC: 12 MMOL/L (ref 10–20)
APPEARANCE UR: CLEAR
BACTERIA #/AREA URNS HPF: ABNORMAL /HPF
BASOPHILS # BLD: 0 THOUSAND/MCL (ref 0–0.3)
BASOPHILS NFR BLD: 0 %
BILIRUB UR QL: NEGATIVE
BUN SERPL-MCNC: 27 MG/DL (ref 6–20)
BUN/CREAT SERPL: 29 (ref 7–25)
CALCIUM SERPL-MCNC: 8.4 MG/DL (ref 8.4–10.2)
CAOX CRY URNS QL MICRO: ABNORMAL
CHLORIDE: 106 MMOL/L (ref 98–107)
CO2 SERPL-SCNC: 25 MMOL/L (ref 21–32)
COLOR UR: YELLOW
CREAT SERPL-MCNC: 0.92 MG/DL (ref 0.51–0.95)
DIFFERENTIAL METHOD BLD: ABNORMAL
EOSINOPHIL # BLD: 0.1 THOUSAND/MCL (ref 0.1–0.5)
EOSINOPHIL NFR BLD: 1 %
EPITH CASTS #/AREA URNS LPF: ABNORMAL /[LPF]
ERYTHROCYTE [DISTWIDTH] IN BLOOD: 14.3 % (ref 11–15)
FATTY CASTS #/AREA URNS LPF: ABNORMAL /[LPF]
GLUCOSE SERPL-MCNC: 150 MG/DL (ref 65–99)
GLUCOSE UR-MCNC: NEGATIVE MG/DL
GRAN CASTS #/AREA URNS LPF: ABNORMAL /[LPF]
HEMATOCRIT: 32.4 % (ref 36–46.5)
HGB BLD-MCNC: 10.3 GM/DL (ref 12–15.5)
HGB UR QL: NEGATIVE
HYALINE CASTS #/AREA URNS LPF: ABNORMAL /LPF (ref 0–5)
KETONES UR-MCNC: NEGATIVE MG/DL
LEUKOCYTE ESTERASE UR QL STRIP: NEGATIVE
LYMPHOCYTES # BLD: 1.4 THOUSAND/MCL (ref 1–4)
LYMPHOCYTES NFR BLD: 21 %
MAGNESIUM SERPL-MCNC: 2.2 MG/DL (ref 1.7–2.4)
MCH RBC QN AUTO: 29.6 PG (ref 26–34)
MCHC RBC AUTO-ENTMCNC: 31.8 GM/DL (ref 32–36.5)
MCV RBC AUTO: 93.1 FL (ref 78–100)
MIXED CELL CASTS #/AREA URNS LPF: ABNORMAL /[LPF]
MONOCYTES # BLD: 0.3 THOUSAND/MCL (ref 0.3–0.9)
MONOCYTES NFR BLD: 5 %
MUCOUS THREADS URNS QL MICRO: PRESENT
NEUTROPHILS # BLD: 4.9 THOUSAND/MCL (ref 1.8–7.7)
NEUTROPHILS NFR BLD: 73 %
NEUTS SEG NFR BLD: ABNORMAL %
NITRITE UR QL: NEGATIVE
NRBC (NRBCRE): ABNORMAL
PH UR: 5 UNIT (ref 5–7)
PLATELET # BLD: 256 THOUSAND/MCL (ref 140–450)
POTASSIUM SERPL-SCNC: 4.4 MMOL/L (ref 3.4–5.1)
PROT UR QL: NEGATIVE MG/DL
RBC # BLD: 3.48 MILLION/MCL (ref 4–5.2)
RBC #/AREA URNS HPF: ABNORMAL /HPF (ref 0–3)
RBC CASTS #/AREA URNS LPF: ABNORMAL /[LPF]
RENAL EPI CELLS #/AREA URNS HPF: ABNORMAL /[HPF]
SODIUM SERPL-SCNC: 139 MMOL/L (ref 135–145)
SP GR UR: 1.02 (ref 1–1.03)
SPECIMEN SOURCE: ABNORMAL
SPERM URNS QL MICRO: ABNORMAL
SQUAMOUS #/AREA URNS HPF: ABNORMAL /HPF (ref 0–5)
T VAGINALIS URNS QL MICRO: ABNORMAL
TRI-PHOS CRY URNS QL MICRO: ABNORMAL
TROPONIN I SERPL HS-MCNC: <0.02 NG/ML
URATE CRY URNS QL MICRO: ABNORMAL
URINE REFLEX: ABNORMAL
URNS CMNT MICRO: ABNORMAL
UROBILINOGEN UR QL: 0.2 MG/DL (ref 0–1)
WAXY CASTS #/AREA URNS LPF: ABNORMAL /[LPF]
WBC # BLD: 6.6 THOUSAND/MCL (ref 4.2–11)
WBC #/AREA URNS HPF: ABNORMAL /HPF (ref 0–5)
WBC CASTS #/AREA URNS LPF: ABNORMAL /[LPF]
YEAST HYPHAE URNS QL MICRO: ABNORMAL
YEAST URNS QL MICRO: ABNORMAL

## 2018-05-16 NOTE — TELEPHONE ENCOUNTER
Candelario Rangel, speech therapist with [facility], wants to speak w/ a nurse since pt's blood pressure readings were 100/60 and 80/50.

## 2018-05-16 NOTE — TELEPHONE ENCOUNTER
Shahid Rojas, speech therapist w/ [facility] , left a voicemail stating that she did call 911 for the pt since her BP dropped to 70/50 - the pt is being transported to [facility] and she told the paramedics what Payton Lowe recommended - eval and IV hydration.    C

## 2018-05-16 NOTE — TELEPHONE ENCOUNTER
Spoke with [facility] speech therapist she is currently at pt's home for visit. She reports she had a difficult time getting pt's blood pressure today it was very faint blood pressure.   Current reading is 80/50, pt is some fatigued, but denies dizziness, difficul

## 2018-05-17 ENCOUNTER — DIAGNOSTIC TRANS (OUTPATIENT)
Dept: OTHER | Age: 83
End: 2018-05-17

## 2018-05-17 ENCOUNTER — TELEPHONE (OUTPATIENT)
Dept: INTERNAL MEDICINE CLINIC | Facility: CLINIC | Age: 83
End: 2018-05-17

## 2018-05-17 NOTE — TELEPHONE ENCOUNTER
Ok per Dr. Teresa Moran to resume nursing care and OT. Ramonita Stephenson, OT therapist for [facility] and relayed message. She verbalized understanding and agreed with plan, she will fax orders to be signed.     Spoke with daughter, she asked if patient can take something

## 2018-05-17 NOTE — TELEPHONE ENCOUNTER
Pt was seen in ER yesterday per [facility] OT and was treated for dehydration and compression fractures.  [facility] is asking that we resume nursing and what type of pain meds can be administered and continue OT for 2 weeks

## 2018-05-31 NOTE — TELEPHONE ENCOUNTER
KUNAL - Patient is going to be discharged on Wednesday, June 6th from Occupation Therapy. She stated patient is doing ok.  If you have any questions or concerns please give them a call at [phone]

## 2018-08-24 NOTE — TELEPHONE ENCOUNTER
Received fax from [address] [place] [address] requesting CMN form to be completed, signed, and faxed back to #[phone]. Form in triage.

## 2018-11-28 ENCOUNTER — HOSPITAL (OUTPATIENT)
Dept: OTHER | Age: 83
End: 2018-11-28
Attending: INTERNAL MEDICINE

## 2018-11-28 ENCOUNTER — IMAGING SERVICES (OUTPATIENT)
Dept: OTHER | Age: 83
End: 2018-11-28

## 2018-11-28 ENCOUNTER — DIAGNOSTIC TRANS (OUTPATIENT)
Dept: OTHER | Age: 83
End: 2018-11-28

## 2018-11-28 LAB
ALBUMIN SERPL-MCNC: 3.7 GM/DL (ref 3.6–5.1)
ALP SERPL-CCNC: 80 UNIT/L (ref 45–117)
ALT SERPL-CCNC: 22 UNIT/L
AMORPH SED URNS QL MICRO: ABNORMAL
ANALYZER ANC (IANC): ABNORMAL
ANION GAP SERPL CALC-SCNC: 15 MMOL/L (ref 10–20)
ANION GAP SERPL CALC-SCNC: 15 MMOL/L (ref 10–20)
ANION GAP SERPL CALC-SCNC: 17 MMOL/L (ref 10–20)
ANION GAP SERPL CALC-SCNC: 19 MMOL/L (ref 10–20)
APPEARANCE UR: ABNORMAL
AST SERPL-CCNC: 28 UNIT/L
BACTERIA #/AREA URNS HPF: ABNORMAL /HPF
BILIRUB CONJ SERPL-MCNC: 0.1 MG/DL (ref 0–0.2)
BILIRUB SERPL-MCNC: 0.5 MG/DL (ref 0.2–1)
BILIRUB UR QL: NEGATIVE
BUN SERPL-MCNC: 37 MG/DL (ref 6–20)
BUN SERPL-MCNC: 38 MG/DL (ref 6–20)
BUN SERPL-MCNC: 41 MG/DL (ref 6–20)
BUN SERPL-MCNC: 46 MG/DL (ref 6–20)
BUN/CREAT SERPL: 58 (ref 7–25)
BUN/CREAT SERPL: 58 (ref 7–25)
BUN/CREAT SERPL: 66 (ref 7–25)
BUN/CREAT SERPL: 71 (ref 7–25)
CALCIUM SERPL-MCNC: 8.2 MG/DL (ref 8.4–10.2)
CALCIUM SERPL-MCNC: 8.4 MG/DL (ref 8.4–10.2)
CALCIUM SERPL-MCNC: 8.6 MG/DL (ref 8.4–10.2)
CALCIUM SERPL-MCNC: 9.8 MG/DL (ref 8.4–10.2)
CAOX CRY URNS QL MICRO: ABNORMAL
CHLORIDE: 118 MMOL/L (ref 98–107)
CHLORIDE: 120 MMOL/L (ref 98–107)
CHLORIDE: 120 MMOL/L (ref 98–107)
CHLORIDE: 121 MMOL/L (ref 98–107)
CO2 SERPL-SCNC: 23 MMOL/L (ref 21–32)
CO2 SERPL-SCNC: 23 MMOL/L (ref 21–32)
CO2 SERPL-SCNC: 24 MMOL/L (ref 21–32)
CO2 SERPL-SCNC: 25 MMOL/L (ref 21–32)
COLOR UR: YELLOW
CREAT SERPL-MCNC: 0.56 MG/DL (ref 0.51–0.95)
CREAT SERPL-MCNC: 0.58 MG/DL (ref 0.51–0.95)
CREAT SERPL-MCNC: 0.66 MG/DL (ref 0.51–0.95)
CREAT SERPL-MCNC: 0.79 MG/DL (ref 0.51–0.95)
DIFFERENTIAL METHOD BLD: ABNORMAL
EOSINOPHIL # BLD: 0.1 THOUSAND/MCL (ref 0.1–0.5)
EOSINOPHIL NFR BLD: 1 %
EPITH CASTS #/AREA URNS LPF: ABNORMAL /[LPF]
ERYTHROCYTE [DISTWIDTH] IN BLOOD: 15.8 % (ref 11–15)
FATTY CASTS #/AREA URNS LPF: ABNORMAL /[LPF]
GLUCOSE SERPL-MCNC: 102 MG/DL (ref 65–99)
GLUCOSE SERPL-MCNC: 102 MG/DL (ref 65–99)
GLUCOSE SERPL-MCNC: 182 MG/DL (ref 65–99)
GLUCOSE SERPL-MCNC: 99 MG/DL (ref 65–99)
GLUCOSE UR-MCNC: ABNORMAL MG/DL
GRAN CASTS #/AREA URNS LPF: ABNORMAL /[LPF]
HEMATOCRIT: 48 % (ref 36–46.5)
HGB BLD-MCNC: 15 GM/DL (ref 12–15.5)
HGB UR QL: ABNORMAL
HYALINE CASTS #/AREA URNS LPF: ABNORMAL /LPF (ref 0–5)
KETONES UR-MCNC: NEGATIVE MG/DL
LACTATE BLDV-MCNC: 4.2 MMOL/L
LACTATE BLDV-MCNC: 4.8 MMOL/L
LACTATE BLDV-SCNC: 1.4 MMOL/L (ref 0–2)
LACTATE BLDV-SCNC: 4.3 MMOL/L (ref 0–2)
LEUKOCYTE ESTERASE UR QL STRIP: NEGATIVE
LIPASE SERPL-CCNC: 98 UNIT/L (ref 73–393)
LYMPHOCYTES # BLD: 3.2 THOUSAND/MCL (ref 1–4)
LYMPHOCYTES NFR BLD: 34 %
MCH RBC QN AUTO: 30.7 PG (ref 26–34)
MCHC RBC AUTO-ENTMCNC: 31.3 GM/DL (ref 32–36.5)
MCV RBC AUTO: 98.4 FL (ref 78–100)
MIXED CELL CASTS #/AREA URNS LPF: ABNORMAL /[LPF]
MONOCYTES # BLD: 0.5 THOUSAND/MCL (ref 0.3–0.9)
MONOCYTES NFR BLD: 5 %
MUCOUS THREADS URNS QL MICRO: PRESENT
NEUTROPHILS # BLD: 5.5 THOUSAND/MCL (ref 1.8–7.7)
NEUTROPHILS # BLD: ABNORMAL 10*3/UL
NEUTROPHILS NFR BLD: 60 %
NEUTS SEG NFR BLD: ABNORMAL %
NITRITE UR QL: NEGATIVE
NRBC (NRBCRE): 0 /100 WBC
PH UR: 7 UNIT (ref 5–7)
PLATELET # BLD: 252 THOUSAND/MCL (ref 140–450)
POTASSIUM SERPL-SCNC: 3.6 MMOL/L (ref 3.4–5.1)
POTASSIUM SERPL-SCNC: 3.9 MMOL/L (ref 3.4–5.1)
POTASSIUM SERPL-SCNC: 3.9 MMOL/L (ref 3.4–5.1)
POTASSIUM SERPL-SCNC: 4.8 MMOL/L (ref 3.4–5.1)
PROT SERPL-MCNC: 8.6 GM/DL (ref 6.4–8.2)
PROT UR QL: 100 MG/DL
RBC # BLD: 4.88 MILLION/MCL (ref 4–5.2)
RBC #/AREA URNS HPF: ABNORMAL /HPF (ref 0–3)
RBC CASTS #/AREA URNS LPF: ABNORMAL /[LPF]
RENAL EPI CELLS #/AREA URNS HPF: ABNORMAL /[HPF]
SODIUM SERPL-SCNC: 155 MMOL/L (ref 135–145)
SODIUM SERPL-SCNC: 156 MMOL/L (ref 135–145)
SP GR UR: 1.01 (ref 1–1.03)
SPECIMEN SOURCE: ABNORMAL
SPERM URNS QL MICRO: ABNORMAL
SQUAMOUS #/AREA URNS HPF: ABNORMAL /HPF (ref 0–5)
T VAGINALIS URNS QL MICRO: ABNORMAL
TRI-PHOS CRY URNS QL MICRO: ABNORMAL
TROPONIN I SERPL HS-MCNC: 0.02 NG/ML
URATE CRY URNS QL MICRO: ABNORMAL
URINE REFLEX: ABNORMAL
URNS CMNT MICRO: ABNORMAL
UROBILINOGEN UR QL: 1 MG/DL (ref 0–1)
WAXY CASTS #/AREA URNS LPF: ABNORMAL /[LPF]
WBC # BLD: 9.3 THOUSAND/MCL (ref 4.2–11)
WBC #/AREA URNS HPF: ABNORMAL /HPF (ref 0–5)
WBC CASTS #/AREA URNS LPF: ABNORMAL /[LPF]
YEAST HYPHAE URNS QL MICRO: ABNORMAL
YEAST URNS QL MICRO: PRESENT

## 2018-11-29 LAB
ANALYZER ANC (IANC): ABNORMAL
ANION GAP SERPL CALC-SCNC: 16 MMOL/L (ref 10–20)
ANION GAP SERPL CALC-SCNC: 16 MMOL/L (ref 10–20)
ANION GAP SERPL CALC-SCNC: 18 MMOL/L (ref 10–20)
BUN SERPL-MCNC: 25 MG/DL (ref 6–20)
BUN SERPL-MCNC: 28 MG/DL (ref 6–20)
BUN SERPL-MCNC: 33 MG/DL (ref 6–20)
BUN/CREAT SERPL: 50 (ref 7–25)
BUN/CREAT SERPL: 54 (ref 7–25)
BUN/CREAT SERPL: 59 (ref 7–25)
CALCIUM SERPL-MCNC: 8.3 MG/DL (ref 8.4–10.2)
CALCIUM SERPL-MCNC: 8.4 MG/DL (ref 8.4–10.2)
CALCIUM SERPL-MCNC: 8.5 MG/DL (ref 8.4–10.2)
CHLORIDE: 115 MMOL/L (ref 98–107)
CHLORIDE: 115 MMOL/L (ref 98–107)
CHLORIDE: 120 MMOL/L (ref 98–107)
CO2 SERPL-SCNC: 19 MMOL/L (ref 21–32)
CO2 SERPL-SCNC: 20 MMOL/L (ref 21–32)
CO2 SERPL-SCNC: 21 MMOL/L (ref 21–32)
CREAT SERPL-MCNC: 0.5 MG/DL (ref 0.51–0.95)
CREAT SERPL-MCNC: 0.52 MG/DL (ref 0.51–0.95)
CREAT SERPL-MCNC: 0.56 MG/DL (ref 0.51–0.95)
ERYTHROCYTE [DISTWIDTH] IN BLOOD: 15.5 % (ref 11–15)
GLUCOSE BLDC GLUCOMTR-MCNC: 112 MG/DL (ref 65–99)
GLUCOSE BLDC GLUCOMTR-MCNC: 139 MG/DL (ref 65–99)
GLUCOSE BLDC GLUCOMTR-MCNC: 42 MG/DL (ref 65–99)
GLUCOSE BLDC GLUCOMTR-MCNC: 58 MG/DL (ref 65–99)
GLUCOSE BLDC GLUCOMTR-MCNC: 95 MG/DL (ref 65–99)
GLUCOSE SERPL-MCNC: 113 MG/DL (ref 65–99)
GLUCOSE SERPL-MCNC: 66 MG/DL (ref 65–99)
GLUCOSE SERPL-MCNC: 84 MG/DL (ref 65–99)
HEMATOCRIT: 36.6 % (ref 36–46.5)
HGB BLD-MCNC: 11.2 GM/DL (ref 12–15.5)
MCH RBC QN AUTO: 30.2 PG (ref 26–34)
MCHC RBC AUTO-ENTMCNC: 30.6 GM/DL (ref 32–36.5)
MCV RBC AUTO: 98.7 FL (ref 78–100)
NRBC (NRBCRE): 0 /100 WBC
PLATELET # BLD: 186 THOUSAND/MCL (ref 140–450)
POTASSIUM SERPL-SCNC: 3.6 MMOL/L (ref 3.4–5.1)
POTASSIUM SERPL-SCNC: 4 MMOL/L (ref 3.4–5.1)
POTASSIUM SERPL-SCNC: 4.4 MMOL/L (ref 3.4–5.1)
RBC # BLD: 3.71 MILLION/MCL (ref 4–5.2)
SODIUM SERPL-SCNC: 148 MMOL/L (ref 135–145)
SODIUM SERPL-SCNC: 148 MMOL/L (ref 135–145)
SODIUM SERPL-SCNC: 152 MMOL/L (ref 135–145)
WBC # BLD: 8.6 THOUSAND/MCL (ref 4.2–11)

## 2018-11-30 LAB
ANALYZER ANC (IANC): ABNORMAL
ANION GAP SERPL CALC-SCNC: 12 MMOL/L (ref 10–20)
ANION GAP SERPL CALC-SCNC: 15 MMOL/L (ref 10–20)
BASOPHILS # BLD: 0 THOUSAND/MCL (ref 0–0.3)
BASOPHILS NFR BLD: 0 %
BUN SERPL-MCNC: 12 MG/DL (ref 6–20)
BUN SERPL-MCNC: 15 MG/DL (ref 6–20)
BUN/CREAT SERPL: 26 (ref 7–25)
BUN/CREAT SERPL: 32 (ref 7–25)
CALCIUM SERPL-MCNC: 8 MG/DL (ref 8.4–10.2)
CALCIUM SERPL-MCNC: 8.2 MG/DL (ref 8.4–10.2)
CHLORIDE: 112 MMOL/L (ref 98–107)
CHLORIDE: 113 MMOL/L (ref 98–107)
CO2 SERPL-SCNC: 20 MMOL/L (ref 21–32)
CO2 SERPL-SCNC: 22 MMOL/L (ref 21–32)
CREAT SERPL-MCNC: 0.47 MG/DL (ref 0.51–0.95)
CREAT SERPL-MCNC: 0.47 MG/DL (ref 0.51–0.95)
DIFFERENTIAL METHOD BLD: ABNORMAL
EOSINOPHIL # BLD: 0.1 THOUSAND/MCL (ref 0.1–0.5)
EOSINOPHIL NFR BLD: 2 %
ERYTHROCYTE [DISTWIDTH] IN BLOOD: 15.1 % (ref 11–15)
GLUCOSE BLDC GLUCOMTR-MCNC: 108 MG/DL (ref 65–99)
GLUCOSE BLDC GLUCOMTR-MCNC: 96 MG/DL (ref 65–99)
GLUCOSE BLDC GLUCOMTR-MCNC: 99 MG/DL (ref 65–99)
GLUCOSE SERPL-MCNC: 101 MG/DL (ref 65–99)
GLUCOSE SERPL-MCNC: 122 MG/DL (ref 65–99)
HEMATOCRIT: 34.7 % (ref 36–46.5)
HGB BLD-MCNC: 10.9 GM/DL (ref 12–15.5)
IMM GRANULOCYTES # BLD AUTO: 0.1 THOUSAND/MCL (ref 0–0.2)
IMM GRANULOCYTES NFR BLD: 1 %
LYMPHOCYTES # BLD: 1.3 THOUSAND/MCL (ref 1–4)
LYMPHOCYTES NFR BLD: 16 %
MCH RBC QN AUTO: 30.5 PG (ref 26–34)
MCHC RBC AUTO-ENTMCNC: 31.4 GM/DL (ref 32–36.5)
MCV RBC AUTO: 97.2 FL (ref 78–100)
MONOCYTES # BLD: 0.6 THOUSAND/MCL (ref 0.3–0.9)
MONOCYTES NFR BLD: 8 %
NEUTROPHILS # BLD: 5.8 THOUSAND/MCL (ref 1.8–7.7)
NEUTROPHILS NFR BLD: 73 %
NEUTS SEG NFR BLD: ABNORMAL %
NRBC (NRBCRE): 0 /100 WBC
PLATELET # BLD: 181 THOUSAND/MCL (ref 140–450)
POTASSIUM SERPL-SCNC: 3.4 MMOL/L (ref 3.4–5.1)
POTASSIUM SERPL-SCNC: 4.5 MMOL/L (ref 3.4–5.1)
RBC # BLD: 3.57 MILLION/MCL (ref 4–5.2)
SODIUM SERPL-SCNC: 143 MMOL/L (ref 135–145)
SODIUM SERPL-SCNC: 143 MMOL/L (ref 135–145)
WBC # BLD: 7.9 THOUSAND/MCL (ref 4.2–11)

## 2018-12-05 NOTE — TELEPHONE ENCOUNTER
Jacquie from [facility] called to get verbal orders for a hospice evaluation - the family is requesting. Please c/b at #[phone].

## 2018-12-26 NOTE — TELEPHONE ENCOUNTER
Patient's daughter, Josefa Dawson, faxed over [address] paperwork to be filled out. Please see in triage.

## 2018-12-27 NOTE — TELEPHONE ENCOUNTER
Forms faxed to [facility]. My chart message sent to [facility] notifying her of above.

## 2019-01-01 DIAGNOSIS — I10 ESSENTIAL HYPERTENSION: ICD-10-CM

## 2019-01-01 DIAGNOSIS — I63.9 CEREBROVASCULAR ACCIDENT (CVA), UNSPECIFIED MECHANISM (HCC): ICD-10-CM

## 2019-01-01 DIAGNOSIS — R47.01 APHASIA: ICD-10-CM

## 2019-01-01 DIAGNOSIS — R53.1 RIGHT SIDED WEAKNESS: ICD-10-CM

## 2019-01-01 RX ORDER — ATORVASTATIN CALCIUM 10 MG/1
10 TABLET, FILM COATED ORAL DAILY
Qty: 30 TABLET | Refills: 3 | Status: SHIPPED | OUTPATIENT
Start: 2019-01-01 | End: 2019-06-04

## 2019-01-01 RX ORDER — LISINOPRIL 20 MG/1
TABLET ORAL
Qty: 90 TABLET | Refills: 0 | Status: SHIPPED | OUTPATIENT
Start: 2019-01-01

## 2019-06-04 DIAGNOSIS — I63.9 CEREBROVASCULAR ACCIDENT (CVA), UNSPECIFIED MECHANISM (HCC): ICD-10-CM

## 2019-06-04 DIAGNOSIS — R47.01 APHASIA: ICD-10-CM

## 2019-06-04 DIAGNOSIS — R53.1 RIGHT SIDED WEAKNESS: ICD-10-CM

## 2019-06-04 RX ORDER — ATORVASTATIN CALCIUM 10 MG/1
TABLET, FILM COATED ORAL
Qty: 30 TABLET | Refills: 2 | Status: SHIPPED | OUTPATIENT
Start: 2019-06-04

## 2019-06-09 DIAGNOSIS — I10 ESSENTIAL HYPERTENSION: ICD-10-CM

## 2019-06-09 RX ORDER — LISINOPRIL 20 MG/1
TABLET ORAL
Qty: 90 TABLET | Refills: 0 | OUTPATIENT
Start: 2019-06-09

## 2024-02-25 NOTE — TELEPHONE ENCOUNTER
The pt does have an appointment scheduled for 5/8/2018. Dr. Albaro Plascencia will sign orders for the pt. HH verbalized understanding. non-distended

## (undated) NOTE — IP AVS SNAPSHOT
Patient Demographics     Address  [address] Phone  [phone] [facility])  [phone] [facility] E-mail Address  [email or web address]      Emergency Contact(s)     Name Relation Home Work Raven Guzman [phone] Colin Chapman MD         Lutein-Zeaxanthin 25-5 MG Caps  Next dose due:  9/20/17 at 9 am      Take by mouth daily.           TraMADol HCl 50 MG Tabs  Commonly known as:  ULTRAM      Take 1 tablet (50 mg total) by mouth every 6 (six) hours as needed (modera Pulse  87 Filed at 09/19/2017 1558   Resp  16 Filed at 09/19/2017 1558   Temp  98 °F (36.7 °C) Filed at 09/19/2017 1558   SpO2  96 % Filed at 09/19/2017 2217      Patient's Most Recent Weight    Flowsheet Row Most Recent Value   Patient Weight  31.8 kg (70 [facility] [address] 02/03/16 1801 - Present            Microbiology Results (All)     Procedure Component Value Units Date/Time    Blood Culture FREQ X 2 [[id number]] Collected:  09/16/17 1927    Order Status:  Completed Lab Status:  Preliminary result Yariel Whitman History of Present Illness: Babak Lehman is a 80year old female with confusion. [place] on Monday and sustained pubic rami fractures and has been bedbound ever since at home.   Has been given tylenol#3 tabs by family but patient has become less talkative, Cholecalciferol (VITAMIN D3) 2000 UNITS Oral Tab Take by mouth daily. Disp:  Rfl:    vitamin E 400 UNITS Oral Cap Take 400 Units by mouth daily. Disp:  Rfl:    Lutein-Zeaxanthin 25-5 MG Oral Cap Take by mouth daily.  Disp:  Rfl:        Review of Systems: 2. Encephalopathy due to narcotics and UTI-check abg to ensure no hypercapnea  3. UTI[JM.1], possible pyelonephritis (flank pain and low grade fever)[JM.2]-rocephin ordered[JM. 1]-monitor cultures[JM.2]  4.  Hyperkalemia-fluids ordered; stop acei-recheck bmp very somnolent and fatigued at this time.   Past Medical History:  Past Medical History:   Diagnosis Date   • Anemia    • Cataract    • Compression fracture    • Compression fx, lumbar spine (HCC)     T12 and L4   • Constipation    • GI bleed 11/2014   • Re BP (!) 165/73   Pulse 83   Temp 99.5 °F (37.5 °C) (Temporal)   Resp 16   Wt 70 lb (31.8 kg)   SpO2 96%   BMI 14.14 kg/m²   General: somnolent but rousable but difficult to understand speech  HEENT: pinpoint pupil on left; rigth eye with unreactvie pupil (c · DVT Prophylaxis: heparin,scds  · CODE status: full  · Baker: no    Plan of care discussed with patient and er md Shilo Evans MD  9/16/2017[JM.1]              Electronically signed by Juan Schroeder MD on 9/16/2017  6:13 PM   Attribution [place]    DECLAN. 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   • Unspecified essential hypertension        Past Surgical History  Past Surgical History:  11/9/2014: COLONOSCOPY N/A      Comment: Procedure: COLONOSCOPY; CMS Modifier (G-Code): CK    FUNCTIONAL ABILITY STATUS  Gait Assessment   Gait Assistance: Maximum assistance (Actual assist - min)  Distance (ft): 50 ft with chair closely following  Assistive Device: Rolling walker  Pattern: Shuffle  Stoop/Curb Assistanc Goal #1 Patient is able to demonstrate supine - sit EOB @ level: minimum assistance--Met 09/19 -Upgraded to CGA    Goal #2 Patient is able to demonstrate transfers EOB to/from [facility] at assistance level: minimum assistance ----Met 09/19 - upgraded to CGA   Go Diagnosis Date   • Anemia    • Cataract    • Compression fracture    • Compression fx, lumbar spine (HCC)     T12 and L4   • Constipation    • GI bleed 11/2014   • Retinal detachment     right eye   • 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     b -   Climbing 3-5 steps with a railing?: Total[BR.2]       AM-PAC Score:[BR.1]  Raw Score: 16   PT Approx Degree of Impairment Score: 54.16%   Standardized Score (AM-PAC Scale): 40.78   CMS Modifier (G-Code): CK[BR.2]    FUNCTIONAL ABILITY STATUS  Gait Asse PT Treatment Plan: Bed mobility; Endurance; Patient education; Family education;Gait training;Strengthening;Transfer training  Rehab Potential : Fair  Frequency (Obs): Daily[BR.2]    CURRENT GOALS[BR.1]   Goal #1 Patient is able to demonstrate supine - sit EO subsequent encounter    Urinary tract infection without hematuria, site unspecified    Anemia, unspecified type    Gait disturbance      Past Medical History  Past Medical History:   Diagnosis Date   • Anemia    • Cataract    • Compression fracture    • Co · Following Commands:  follows one-step commands inconsistently  · Awareness of Deficits:  decreased awareness of deficits    RANGE OF MOTION AND STRENGTH ASSESSMENT  See Occupational Therapy report for UE assessment.     Lower extremity ROM is within funct Assistive Device: Rolling walker  Pattern: Shuffle          Skilled Therapy Provided: pt seen supine in bed. With request to sit at EOB, pt initiated slight LE pivot in bed, but excessively fatigued and unable to perform more.  2 person MAX A to attain sitt impairments: post-op pain, decreased knowledge of adaptive techniques, decreased functional UE and LE strength, decreased activity tolerance, impaired balance, and gait abnormality.   These impairments and comorbidities manifest themselves as functional crowder rolling at assistance level: minimum assistance     Goal #4    Goal #5    Goal #6    Goal Comments: Goals established on 9/17/2017[SG.1]     Attribution [place]    SG. 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   • Unspecified essential hypertension        Past Surgical History  Past Surgical History:  11/9/2014: COLONOSCOPY N/A      Comment: Pr assist for safe hand positioning > after additional time on toilet, patient performed front pericare via SBA > standing via min assist with cues for safe hand placement > additional rear pericare via max assist and brief management via total assist with mi Pt will perform UB dressing w/ min assist --> in progress    Functional Transfer Goals  Pt will complete sit-supine transfers with HOB flat w/ moderate assist  --> MET 9/18; upgrade to SBA  Pt will complete sit-stand transfers w/ moderate assist --> MET 9/ • Constipation    • GI bleed 11/2014   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   • Unspecified essential hypertension        Past Surgical History  Past Surgical History:  11/9/2014: COLONOSCOPY N/ to CGA, cueing, and intermittent assist to safely position RW > toileting in standing via CGA to min assist for balance plus total assist for brief change and rear pericare > toilet transfer to standard height toilet with mod assist, max safety cues, and a Pt will perform toileting: with max assist of one --> MET 9/18; upgrade to min assist  Pt will perform UB dressing w/ min assist --> in progress    Functional Transfer Goals  Pt will complete sit-supine transfers with HOB flat w/ moderate assist  --> MET 9 Past Medical History  Past Medical History:   Diagnosis Date   • Anemia    • Cataract    • Compression fracture    • Compression fx, lumbar spine (HCC)     T12 and L4   • Constipation    • GI bleed 11/2014   • Retinal detachment     right eye   • Retinal d from further cog testing. [MR.4]    VISION[MR.1]  Pt does not wear glasses[MR.4]    PERCEPTION[MR.1]  Overall Perception Status:   WFL - within functional limits[MR.4]    SENSATION[MR.1]  Light touch:  intact[MR.4]    Communication:[MR.1]  Pt would shake he -   Putting on and taking off regular upper body clothing?: A Lot  -   Taking care of personal grooming such as brushing teeth?: A Lot  -   Eating meals?: A Lot    AM-PAC Score:  Score: 9  Approx Degree of Impairment: 79.59%  Standardized Score (AM-PAC Sca that score of 13.95 indicating SNF/IRF, score of 11.25 indicating LTAC). [MR.5] Per AM- PAC, pt with 79.59% impairment in basic ADL and raw score 9/ 24 (research showing that score of 13.95 indicating SNF/IRF, score of 11.25 indicating LTAC). [MR.6]      In conservation/work simplification techniques; Functional transfer training; Endurance training;Patient/Family education;Equipment eval/education; Compensatory technique education  Rehab Potential : Fair  Frequency (Obs): 5x/week  Number of Visits to Meet Estab

## (undated) NOTE — MR AVS SNAPSHOT
[address]  [address] [address] [phone]               Thank you for choosing us for your health care visit with Efra Martins MD.  We are glad to serve you and happy to provide you with this melgar Trans fats are most often found in processed foods. · Reduce sodium (salt) intake. Eating too much salt may increase your blood pressure. Limit your sodium intake to 2,300 milligrams (mg) per day, or less if your health care provider recommends it.  Dining Healthy eating starts at the grocery store. Be sure to pay attention to food labels on packaged foods. Look for products that are high in fiber and protein, and low in saturated fat, cholesterol, and sodium. Avoid products that contain trans fat.  And pay c Take 400 Units by mouth daily.                 Where to Get Your Medications      These medications were sent to [facility], [address] [place] [address], [phone], [facility], [address] [place] [address] Start activities slowly and build up over time Do what you like   Get your heart pumping – brisk walking, biking, swimming Even 10 minute increments are effective and add up over the week   2 ½ hours per week – spread out over time Use a dewayne to keep you

## (undated) NOTE — IP AVS SNAPSHOT
[address]            (For Outpatient Use Only) Initial Admit Date: 9/16/2017   Inpt/Obs Admit Date: Inpt: 9/16/17 / Obs: N/A   Discharge Date:    Ting Aguayo:  [de-identified]   MRN: [de-identified]   CSN: [id number]        ENCOUNTER  Patient